# Patient Record
Sex: FEMALE | Race: WHITE | Employment: PART TIME | URBAN - METROPOLITAN AREA
[De-identification: names, ages, dates, MRNs, and addresses within clinical notes are randomized per-mention and may not be internally consistent; named-entity substitution may affect disease eponyms.]

---

## 2017-02-24 ENCOUNTER — OFFICE VISIT (OUTPATIENT)
Dept: OTOLARYNGOLOGY | Facility: CLINIC | Age: 37
End: 2017-02-24

## 2017-02-24 VITALS — TEMPERATURE: 99 F | HEART RATE: 110 BPM | SYSTOLIC BLOOD PRESSURE: 124 MMHG | DIASTOLIC BLOOD PRESSURE: 76 MMHG

## 2017-02-24 DIAGNOSIS — J03.90 TONSILLITIS: Primary | ICD-10-CM

## 2017-02-24 PROCEDURE — 99213 OFFICE O/P EST LOW 20 MIN: CPT | Performed by: OTOLARYNGOLOGY

## 2017-02-24 PROCEDURE — 99212 OFFICE O/P EST SF 10 MIN: CPT | Performed by: OTOLARYNGOLOGY

## 2017-02-24 RX ORDER — CLINDAMYCIN HYDROCHLORIDE 150 MG/1
150 CAPSULE ORAL EVERY 8 HOURS
Qty: 21 CAPSULE | Refills: 0 | Status: SHIPPED | OUTPATIENT
Start: 2017-02-24 | End: 2017-03-03

## 2017-02-24 RX ORDER — PREDNISONE 20 MG/1
TABLET ORAL
Qty: 10 TABLET | Refills: 0 | Status: SHIPPED | OUTPATIENT
Start: 2017-02-24 | End: 2017-10-02 | Stop reason: ALTCHOICE

## 2017-02-24 NOTE — PATIENT INSTRUCTIONS
Pharyngitis (Sore Throat), Report Pending    Pharyngitis (sore throat) is often due to a virus. It can also be caused by the streptococcus, or strep, bacterium, often called strep throat.  Both viral and strep infections can cause throat pain that is wors · For children: Use acetaminophen for fever, fussiness, or discomfort.  In infants older than 10months of age, you may use ibuprofen instead of acetaminophen. Talk with your child's healthcare provider before giving these medicines if your child has chronic · Signs of dehydration (very dark urine or no urine, sunken eyes, dizziness)  · Trouble breathing or noisy breathing  · Muffled voice  · New rash  · Child appears to be getting sicker  Date Last Reviewed: 4/13/2015  © 4727-5554 [employer]. 7

## 2017-02-24 NOTE — PROGRESS NOTES
Zack Ely is a 31year old female.  Patient presents with:  Sore Throat: recurrent strep infection, pt seen in immediate care on 02/22 and pt postive for  2nd strep infection this month, pt was given Penicillin injection and has been augmentin     HPI: Submandibular. Anterior cervical. Posterior cervical. Supraclavicular.    Eyes Normal Conjunctiva - Right: Normal, Left: Normal. Pupil - Right: Normal, Left: Normal.    Ears Normal Inspection - Right: Normal, Left: Normal. Canal - Left: Normal. TM - Right:

## 2017-03-31 PROCEDURE — 87086 URINE CULTURE/COLONY COUNT: CPT | Performed by: INTERNAL MEDICINE

## 2019-01-31 PROCEDURE — 87624 HPV HI-RISK TYP POOLED RSLT: CPT | Performed by: OBSTETRICS & GYNECOLOGY

## 2019-01-31 PROCEDURE — 88175 CYTOPATH C/V AUTO FLUID REDO: CPT | Performed by: OBSTETRICS & GYNECOLOGY

## 2019-06-18 ENCOUNTER — OFFICE VISIT (OUTPATIENT)
Dept: AUDIOLOGY | Facility: CLINIC | Age: 39
End: 2019-06-18
Payer: COMMERCIAL

## 2019-06-18 ENCOUNTER — OFFICE VISIT (OUTPATIENT)
Dept: OTOLARYNGOLOGY | Facility: CLINIC | Age: 39
End: 2019-06-18
Payer: COMMERCIAL

## 2019-06-18 VITALS
TEMPERATURE: 99 F | WEIGHT: 195 LBS | SYSTOLIC BLOOD PRESSURE: 118 MMHG | DIASTOLIC BLOOD PRESSURE: 81 MMHG | BODY MASS INDEX: 32.49 KG/M2 | HEIGHT: 65 IN

## 2019-06-18 DIAGNOSIS — H69.82 DYSFUNCTION OF LEFT EUSTACHIAN TUBE: Primary | ICD-10-CM

## 2019-06-18 DIAGNOSIS — H90.12 CONDUCTIVE HEARING LOSS OF LEFT EAR WITH UNRESTRICTED HEARING OF RIGHT EAR: ICD-10-CM

## 2019-06-18 DIAGNOSIS — H90.12 CONDUCTIVE HEARING LOSS OF LEFT EAR WITH UNRESTRICTED HEARING OF RIGHT EAR: Primary | ICD-10-CM

## 2019-06-18 PROCEDURE — 99212 OFFICE O/P EST SF 10 MIN: CPT | Performed by: OTOLARYNGOLOGY

## 2019-06-18 PROCEDURE — 92550 TYMPANOMETRY & REFLEX THRESH: CPT | Performed by: AUDIOLOGIST

## 2019-06-18 PROCEDURE — 99213 OFFICE O/P EST LOW 20 MIN: CPT | Performed by: OTOLARYNGOLOGY

## 2019-06-18 PROCEDURE — 92557 COMPREHENSIVE HEARING TEST: CPT | Performed by: AUDIOLOGIST

## 2019-06-18 NOTE — PROGRESS NOTES
Caridad Ramos is a 42year old female. Patient presents with:  Ear Pain: left ear for about 1 month     HPI:   He has had problems for some time with pressure and popping and crackling issues with the left ear.   When she travels she always has a great deal Anterior cervical. Posterior cervical. Supraclavicular.    Eyes Normal Conjunctiva - Right: Normal, Left: Normal. Pupil - Right: Normal, Left: Normal.    Ears Normal Inspection - Right: Normal, Left: Normal. Canal - Left: Normal. TM - Right: Normal, Left: N

## 2019-06-20 NOTE — PROGRESS NOTES
AUDIOLOGY REPORT      Massiel Moore is a 42year old female     Referring Provider: Haylie Leiva   YOB: 1980  Medical Record: [id number]      Patient Hearing History:  Patient reported known left hearing worse than right.    Previous [place]

## 2019-08-02 PROCEDURE — 86480 TB TEST CELL IMMUN MEASURE: CPT | Performed by: FAMILY MEDICINE

## 2019-08-02 PROCEDURE — 36415 COLL VENOUS BLD VENIPUNCTURE: CPT | Performed by: FAMILY MEDICINE

## 2021-02-01 DIAGNOSIS — Z23 NEED FOR VACCINATION: ICD-10-CM

## 2021-02-06 ENCOUNTER — IMMUNIZATION (OUTPATIENT)
Dept: LAB | Age: 41
End: 2021-02-06
Attending: HOSPITALIST
Payer: COMMERCIAL

## 2021-02-06 DIAGNOSIS — Z23 NEED FOR VACCINATION: Primary | ICD-10-CM

## 2021-02-06 PROCEDURE — 0001A SARSCOV2 VAC 30MCG/0.3ML IM: CPT

## 2021-02-27 ENCOUNTER — IMMUNIZATION (OUTPATIENT)
Dept: LAB | Age: 41
End: 2021-02-27
Attending: HOSPITALIST
Payer: COMMERCIAL

## 2021-02-27 DIAGNOSIS — Z23 NEED FOR VACCINATION: Primary | ICD-10-CM

## 2021-02-27 PROCEDURE — 0002A SARSCOV2 VAC 30MCG/0.3ML IM: CPT

## 2021-04-10 ENCOUNTER — OFFICE VISIT (OUTPATIENT)
Dept: ENDOCRINOLOGY CLINIC | Facility: CLINIC | Age: 41
End: 2021-04-10
Payer: COMMERCIAL

## 2021-04-10 VITALS
BODY MASS INDEX: 35 KG/M2 | DIASTOLIC BLOOD PRESSURE: 89 MMHG | HEART RATE: 99 BPM | SYSTOLIC BLOOD PRESSURE: 132 MMHG | WEIGHT: 213 LBS

## 2021-04-10 DIAGNOSIS — E03.8 SUBCLINICAL HYPOTHYROIDISM: Primary | ICD-10-CM

## 2021-04-10 PROCEDURE — 3079F DIAST BP 80-89 MM HG: CPT | Performed by: INTERNAL MEDICINE

## 2021-04-10 PROCEDURE — 3075F SYST BP GE 130 - 139MM HG: CPT | Performed by: INTERNAL MEDICINE

## 2021-04-10 PROCEDURE — 99203 OFFICE O/P NEW LOW 30 MIN: CPT | Performed by: INTERNAL MEDICINE

## 2021-04-10 RX ORDER — LEVOTHYROXINE SODIUM 0.05 MG/1
50 TABLET ORAL
Qty: 90 TABLET | Refills: 1 | Status: SHIPPED | OUTPATIENT
Start: 2021-04-10 | End: 2021-09-20

## 2021-04-10 NOTE — PROGRESS NOTES
Name: Gary Blankenship  Date: 4/10/2021    Referring Physician: No ref.  provider found    Patient presents with:  Consult: Patient would like to see Endocrinologist to help manage HashimotosAlanisonleonidas is a 46year old fem moisturizing every other night or 2-3 nights per week, Disp: 60 g, Rfl: 3  •  Nitrofurantoin Monohyd Macro 100 MG Oral Cap, Take 1 capsule (100 mg total) by mouth 2 (two) times daily. , Disp: 10 capsule, Rfl: 0  •  Clindamycin Phos-Benzoyl Perox 1-5 % Exter thyroid gland   Neck: Trachea midline  Neurologic: sensory grossly intact and motor grossly intact  Musculoskeletal:  normal muscle strength and tone  PV: normal pulses of carotids, pedals  Skin:  normal moisture and skin texture  Hair & Nails:  normal sca

## 2021-08-12 ENCOUNTER — LAB ENCOUNTER (OUTPATIENT)
Dept: LAB | Age: 41
End: 2021-08-12
Attending: INTERNAL MEDICINE
Payer: COMMERCIAL

## 2021-08-12 DIAGNOSIS — E03.8 SUBCLINICAL HYPOTHYROIDISM: ICD-10-CM

## 2021-08-12 LAB
CORTIS SERPL-MCNC: 14 UG/DL
T4 FREE SERPL-MCNC: 1.1 NG/DL (ref 0.8–1.7)
TSI SER-ACNC: 0.97 MIU/ML (ref 0.36–3.74)

## 2021-08-12 PROCEDURE — 82533 TOTAL CORTISOL: CPT

## 2021-08-12 PROCEDURE — 84443 ASSAY THYROID STIM HORMONE: CPT

## 2021-08-12 PROCEDURE — 84439 ASSAY OF FREE THYROXINE: CPT

## 2021-08-12 PROCEDURE — 36415 COLL VENOUS BLD VENIPUNCTURE: CPT

## 2021-09-20 RX ORDER — LEVOTHYROXINE SODIUM 0.05 MG/1
TABLET ORAL
Qty: 90 TABLET | Refills: 3 | Status: SHIPPED | OUTPATIENT
Start: 2021-09-20

## 2021-11-08 ENCOUNTER — PATIENT MESSAGE (OUTPATIENT)
Dept: ENDOCRINOLOGY CLINIC | Facility: CLINIC | Age: 41
End: 2021-11-08

## 2021-11-08 DIAGNOSIS — E03.8 SUBCLINICAL HYPOTHYROIDISM: Primary | ICD-10-CM

## 2021-11-08 NOTE — TELEPHONE ENCOUNTER
From: Gary Blankenship  To: Diana Montero MD  Sent: 11/8/2021 6:39 AM CST  Subject: Follow up ultrasound    When i was there in April I think you said I should have a follow up ultrasound on my thyroid after a year (which was in October.)     Should I get that

## 2021-11-20 ENCOUNTER — NURSE ONLY (OUTPATIENT)
Dept: ALLERGY | Facility: CLINIC | Age: 41
End: 2021-11-20
Payer: COMMERCIAL

## 2021-11-20 ENCOUNTER — OFFICE VISIT (OUTPATIENT)
Dept: ALLERGY | Facility: CLINIC | Age: 41
End: 2021-11-20
Payer: COMMERCIAL

## 2021-11-20 VITALS
OXYGEN SATURATION: 97 % | DIASTOLIC BLOOD PRESSURE: 84 MMHG | SYSTOLIC BLOOD PRESSURE: 122 MMHG | HEART RATE: 71 BPM | RESPIRATION RATE: 18 BRPM

## 2021-11-20 DIAGNOSIS — J30.89 PERENNIAL ALLERGIC RHINITIS WITH SEASONAL VARIATION: ICD-10-CM

## 2021-11-20 DIAGNOSIS — J30.89 PERENNIAL ALLERGIC RHINITIS WITH SEASONAL VARIATION: Primary | ICD-10-CM

## 2021-11-20 DIAGNOSIS — Z23 FLU VACCINE NEED: ICD-10-CM

## 2021-11-20 DIAGNOSIS — J30.2 PERENNIAL ALLERGIC RHINITIS WITH SEASONAL VARIATION: ICD-10-CM

## 2021-11-20 DIAGNOSIS — J30.2 PERENNIAL ALLERGIC RHINITIS WITH SEASONAL VARIATION: Primary | ICD-10-CM

## 2021-11-20 DIAGNOSIS — Z92.29 COVID-19 VACCINE SERIES COMPLETED: ICD-10-CM

## 2021-11-20 DIAGNOSIS — J45.909 EXTRINSIC ASTHMA WITHOUT COMPLICATION, UNSPECIFIED ASTHMA SEVERITY, UNSPECIFIED WHETHER PERSISTENT: ICD-10-CM

## 2021-11-20 PROCEDURE — 95004 PERQ TESTS W/ALRGNC XTRCS: CPT | Performed by: ALLERGY & IMMUNOLOGY

## 2021-11-20 PROCEDURE — 95024 IQ TESTS W/ALLERGENIC XTRCS: CPT | Performed by: ALLERGY & IMMUNOLOGY

## 2021-11-20 PROCEDURE — 3074F SYST BP LT 130 MM HG: CPT | Performed by: ALLERGY & IMMUNOLOGY

## 2021-11-20 PROCEDURE — 3079F DIAST BP 80-89 MM HG: CPT | Performed by: ALLERGY & IMMUNOLOGY

## 2021-11-20 PROCEDURE — 99204 OFFICE O/P NEW MOD 45 MIN: CPT | Performed by: ALLERGY & IMMUNOLOGY

## 2021-11-20 RX ORDER — ALBUTEROL SULFATE 90 UG/1
2 AEROSOL, METERED RESPIRATORY (INHALATION) EVERY 6 HOURS PRN
Qty: 1 EACH | Refills: 0 | Status: SHIPPED | OUTPATIENT
Start: 2021-11-20

## 2021-11-20 RX ORDER — LEVOCETIRIZINE DIHYDROCHLORIDE 5 MG/1
5 TABLET, FILM COATED ORAL NIGHTLY
Qty: 90 TABLET | Refills: 0 | Status: SHIPPED | OUTPATIENT
Start: 2021-11-20

## 2021-11-20 RX ORDER — ALBUTEROL SULFATE 90 UG/1
2 AEROSOL, METERED RESPIRATORY (INHALATION) EVERY 6 HOURS PRN
Qty: 3 EACH | Refills: 0 | Status: SHIPPED | OUTPATIENT
Start: 2021-11-20

## 2021-11-20 NOTE — PROGRESS NOTES
Jaiden Rincon is a 46year old female. HPI:   Patient presents with: Allergies: Worsening seasonal and evironmental allergies. Reports that she has to take Zyretc but could be better.  Reports she is having itchy ears, itchy nose, itchy eyes and sinus pr Thyroid disease Brother         hyperthyroid   • Thyroid disease Sister         partial thyroidectomy for nodule      Social History: Social History    Tobacco Use      Smoking status: Never Smoker      Smokeless tobacco: Never Used    Vaping Use      Vapi polyphagia  ENMT:  Negative for ear drainage, hearing loss.  See hpi   Eyes:  Negative for eye discharge and vision loss  Gastrointestinal:  Negative for abdominal pain, diarrhea and vomiting  Genitourinary:  Negative for dysuria and hematuria  Hema/Lymph: intubations. No current albuterol inhaler. Can worsen with exercise including shortness of breath. Handouts on asthma triggers and management provided and reviewed.   Current symptom frequency is 2 days/week or less on average once per week per patient

## 2021-11-20 NOTE — PATIENT INSTRUCTIONS
1.  Asthma  Denies symptoms more than 2 days/week. ED visits or prednisone over the past year. No prior history of intubations. No current albuterol inhaler. Can worsen with exercise including shortness of breath.     Handouts on asthma triggers and man

## 2021-12-06 ENCOUNTER — HOSPITAL ENCOUNTER (OUTPATIENT)
Dept: ULTRASOUND IMAGING | Age: 41
Discharge: HOME | End: 2021-12-06
Attending: INTERNAL MEDICINE
Payer: COMMERCIAL

## 2021-12-06 DIAGNOSIS — E03.8 SUBCLINICAL HYPOTHYROIDISM: ICD-10-CM

## 2021-12-06 PROCEDURE — 76536 US EXAM OF HEAD AND NECK: CPT | Performed by: INTERNAL MEDICINE

## 2021-12-09 ENCOUNTER — LAB ENCOUNTER (OUTPATIENT)
Dept: LAB | Facility: HOSPITAL | Age: 41
End: 2021-12-09
Attending: INTERNAL MEDICINE
Payer: COMMERCIAL

## 2021-12-09 ENCOUNTER — OFFICE VISIT (OUTPATIENT)
Dept: ENDOCRINOLOGY CLINIC | Facility: CLINIC | Age: 41
End: 2021-12-09
Payer: COMMERCIAL

## 2021-12-09 VITALS — DIASTOLIC BLOOD PRESSURE: 82 MMHG | SYSTOLIC BLOOD PRESSURE: 127 MMHG

## 2021-12-09 DIAGNOSIS — E03.9 HYPOTHYROIDISM (ACQUIRED): ICD-10-CM

## 2021-12-09 DIAGNOSIS — E03.9 HYPOTHYROIDISM (ACQUIRED): Primary | ICD-10-CM

## 2021-12-09 PROCEDURE — 84439 ASSAY OF FREE THYROXINE: CPT

## 2021-12-09 PROCEDURE — 36416 COLLJ CAPILLARY BLOOD SPEC: CPT | Performed by: INTERNAL MEDICINE

## 2021-12-09 PROCEDURE — 3079F DIAST BP 80-89 MM HG: CPT | Performed by: INTERNAL MEDICINE

## 2021-12-09 PROCEDURE — 82306 VITAMIN D 25 HYDROXY: CPT

## 2021-12-09 PROCEDURE — 3074F SYST BP LT 130 MM HG: CPT | Performed by: INTERNAL MEDICINE

## 2021-12-09 PROCEDURE — 83036 HEMOGLOBIN GLYCOSYLATED A1C: CPT | Performed by: INTERNAL MEDICINE

## 2021-12-09 PROCEDURE — 84443 ASSAY THYROID STIM HORMONE: CPT

## 2021-12-09 PROCEDURE — 36415 COLL VENOUS BLD VENIPUNCTURE: CPT

## 2021-12-09 PROCEDURE — 82947 ASSAY GLUCOSE BLOOD QUANT: CPT | Performed by: INTERNAL MEDICINE

## 2021-12-09 PROCEDURE — 99213 OFFICE O/P EST LOW 20 MIN: CPT | Performed by: INTERNAL MEDICINE

## 2021-12-09 NOTE — PROGRESS NOTES
Name: Christine Masterson  Date: 12/9/2021    Referring Physician: No ref. provider found    No chief complaint on file. HISTORY OF PRESENT ILLNESS   Christine Masterson is a 46year old female who presents for No chief complaint on file.     45 y/o F presents for f 2-3 nights per week, Disp: 60 g, Rfl: 3  •  LEVOTHYROXINE 50 MCG Oral Tab, TAKE 1 TABLET BEFORE BREAKFAST, Disp: 90 tablet, Rfl: 3  •  ONEXTON 1.2-3.75 % External Gel, Apply to aa every day., Disp: 50 g, Rfl: 2  •  Clobetasol Propionate 0.05 % External Marya [place] Concerns:        Caffeine Concern: Yes          coffee        Exercise: No      Medical History:   Past Medical History:   Diagnosis Date   • Asthma    • Gestational diabetes    • Hemorrhoids        Surgical history:   Past Surgical History:   Procedure La

## 2021-12-10 ENCOUNTER — PATIENT MESSAGE (OUTPATIENT)
Dept: ENDOCRINOLOGY CLINIC | Facility: CLINIC | Age: 41
End: 2021-12-10

## 2021-12-10 NOTE — TELEPHONE ENCOUNTER
Dr. Jamel Zamudio, see patient response to result note. Reports taking 2000 units vitamin D daily already.

## 2021-12-10 NOTE — TELEPHONE ENCOUNTER
From: Tone oJn  To: Sarath Koch MD  Sent: 12/10/2021 12:07 PM CST  Subject: Vitamin d    I saw your message regarding my vitamin d and the amount to take every day. I already take that much vitamin d per day, and have for a little over a year.  Should

## 2022-05-11 ENCOUNTER — PATIENT MESSAGE (OUTPATIENT)
Dept: ENDOCRINOLOGY CLINIC | Facility: CLINIC | Age: 42
End: 2022-05-11

## 2022-05-11 NOTE — TELEPHONE ENCOUNTER
From: Shivam Kraft  To: Inessa Niteo MD  Sent: 5/11/2022 7:58 AM CDT  Subject: Upcoming appt     Do I need any blood work/tests done before my next appt June 2nd?      Thank you!!    Danika Calhoun

## 2022-05-11 NOTE — TELEPHONE ENCOUNTER
Dr. Cadet Dock    Vitamin D and thyroid labs pended.  Please advise on any additional lab work needed

## 2022-05-29 ENCOUNTER — LAB ENCOUNTER (OUTPATIENT)
Dept: LAB | Facility: HOSPITAL | Age: 42
End: 2022-05-29
Attending: INTERNAL MEDICINE
Payer: COMMERCIAL

## 2022-05-29 DIAGNOSIS — E06.3 HYPOTHYROIDISM DUE TO HASHIMOTO'S THYROIDITIS: ICD-10-CM

## 2022-05-29 DIAGNOSIS — E55.9 VITAMIN D DEFICIENCY: ICD-10-CM

## 2022-05-29 DIAGNOSIS — E03.8 HYPOTHYROIDISM DUE TO HASHIMOTO'S THYROIDITIS: ICD-10-CM

## 2022-05-29 LAB
T4 FREE SERPL-MCNC: 1.2 NG/DL (ref 0.8–1.7)
TSI SER-ACNC: 1.46 MIU/ML (ref 0.36–3.74)
VIT D+METAB SERPL-MCNC: 43.6 NG/ML (ref 30–100)

## 2022-05-29 PROCEDURE — 36415 COLL VENOUS BLD VENIPUNCTURE: CPT

## 2022-05-29 PROCEDURE — 84443 ASSAY THYROID STIM HORMONE: CPT

## 2022-05-29 PROCEDURE — 84439 ASSAY OF FREE THYROXINE: CPT

## 2022-05-29 PROCEDURE — 82306 VITAMIN D 25 HYDROXY: CPT

## 2022-06-02 ENCOUNTER — OFFICE VISIT (OUTPATIENT)
Dept: ENDOCRINOLOGY CLINIC | Facility: CLINIC | Age: 42
End: 2022-06-02
Payer: COMMERCIAL

## 2022-06-02 VITALS
WEIGHT: 216 LBS | BODY MASS INDEX: 36 KG/M2 | HEART RATE: 94 BPM | DIASTOLIC BLOOD PRESSURE: 82 MMHG | SYSTOLIC BLOOD PRESSURE: 120 MMHG

## 2022-06-02 DIAGNOSIS — E06.3 HYPOTHYROIDISM DUE TO HASHIMOTO'S THYROIDITIS: Primary | ICD-10-CM

## 2022-06-02 DIAGNOSIS — E03.8 HYPOTHYROIDISM DUE TO HASHIMOTO'S THYROIDITIS: Primary | ICD-10-CM

## 2022-06-02 PROCEDURE — 3079F DIAST BP 80-89 MM HG: CPT | Performed by: INTERNAL MEDICINE

## 2022-06-02 PROCEDURE — 3074F SYST BP LT 130 MM HG: CPT | Performed by: INTERNAL MEDICINE

## 2022-06-02 PROCEDURE — 99213 OFFICE O/P EST LOW 20 MIN: CPT | Performed by: INTERNAL MEDICINE

## 2022-06-02 RX ORDER — LIOTHYRONINE SODIUM 5 UG/1
5 TABLET ORAL DAILY
Qty: 90 TABLET | Refills: 1 | Status: SHIPPED | OUTPATIENT
Start: 2022-06-02

## 2022-09-12 ENCOUNTER — OFFICE VISIT (OUTPATIENT)
Dept: ENDOCRINOLOGY CLINIC | Facility: CLINIC | Age: 42
End: 2022-09-12
Payer: COMMERCIAL

## 2022-09-12 ENCOUNTER — LAB ENCOUNTER (OUTPATIENT)
Dept: LAB | Facility: HOSPITAL | Age: 42
End: 2022-09-12
Attending: INTERNAL MEDICINE
Payer: COMMERCIAL

## 2022-09-12 VITALS
DIASTOLIC BLOOD PRESSURE: 67 MMHG | BODY MASS INDEX: 36 KG/M2 | SYSTOLIC BLOOD PRESSURE: 102 MMHG | HEART RATE: 84 BPM | WEIGHT: 219 LBS

## 2022-09-12 DIAGNOSIS — E06.3 HYPOTHYROIDISM DUE TO HASHIMOTO'S THYROIDITIS: ICD-10-CM

## 2022-09-12 DIAGNOSIS — E66.9 OBESITY (BMI 30.0-34.9): ICD-10-CM

## 2022-09-12 DIAGNOSIS — E03.8 HYPOTHYROIDISM DUE TO HASHIMOTO'S THYROIDITIS: ICD-10-CM

## 2022-09-12 DIAGNOSIS — E03.8 SUBCLINICAL HYPOTHYROIDISM: Primary | ICD-10-CM

## 2022-09-12 LAB
T4 FREE SERPL-MCNC: 1 NG/DL (ref 0.8–1.7)
TSI SER-ACNC: 2.75 MIU/ML (ref 0.36–3.74)

## 2022-09-12 PROCEDURE — 99214 OFFICE O/P EST MOD 30 MIN: CPT | Performed by: INTERNAL MEDICINE

## 2022-09-12 PROCEDURE — 3074F SYST BP LT 130 MM HG: CPT | Performed by: INTERNAL MEDICINE

## 2022-09-12 PROCEDURE — 84443 ASSAY THYROID STIM HORMONE: CPT

## 2022-09-12 PROCEDURE — 36415 COLL VENOUS BLD VENIPUNCTURE: CPT

## 2022-09-12 PROCEDURE — 3078F DIAST BP <80 MM HG: CPT | Performed by: INTERNAL MEDICINE

## 2022-09-12 PROCEDURE — 84439 ASSAY OF FREE THYROXINE: CPT

## 2022-09-12 RX ORDER — SEMAGLUTIDE 1.34 MG/ML
0.5 INJECTION, SOLUTION SUBCUTANEOUS WEEKLY
Qty: 1.5 ML | Refills: 3 | Status: SHIPPED | OUTPATIENT
Start: 2022-09-12

## 2022-09-12 RX ORDER — SEMAGLUTIDE 1.34 MG/ML
0.5 INJECTION, SOLUTION SUBCUTANEOUS WEEKLY
Qty: 1.5 ML | Refills: 0 | COMMUNITY
Start: 2022-09-12

## 2022-09-12 RX ORDER — LEVOTHYROXINE SODIUM 0.05 MG/1
50 TABLET ORAL
Qty: 90 TABLET | Refills: 3 | Status: SHIPPED | OUTPATIENT
Start: 2022-09-12

## 2022-09-12 RX ORDER — LIOTHYRONINE SODIUM 5 UG/1
5 TABLET ORAL DAILY
Qty: 90 TABLET | Refills: 1 | Status: SHIPPED | OUTPATIENT
Start: 2022-09-12

## 2022-09-12 NOTE — PROGRESS NOTES
Sample of Ozempic 0.25 mg provided to patient in clinic today. Reviewed administration, storage, potential medication interactions, allergies, and side effects of the medication with patient. Reviewed dosage and subcutaneous injection technique. Written instructions of the above including medication dose, route, frequency, storage, administration, and potential side effects provided to patient.

## 2022-09-12 NOTE — PATIENT INSTRUCTIONS
START Ozempic 0.25mg subcutaneous weekly for one month then increase to 0.5mg subcutaneous weekly 86.68 70.83

## 2022-09-15 ENCOUNTER — TELEPHONE (OUTPATIENT)
Dept: ENDOCRINOLOGY CLINIC | Facility: CLINIC | Age: 42
End: 2022-09-15

## 2022-09-19 RX ORDER — LEVOTHYROXINE SODIUM 0.05 MG/1
TABLET ORAL
Qty: 90 TABLET | Refills: 3 | Status: SHIPPED | OUTPATIENT
Start: 2022-09-19

## 2022-09-19 NOTE — TELEPHONE ENCOUNTER
LOV: 9/12/22    RTC: 1 Year    FU: 3/16/23    Last Refill: 9/12/22 with a 1 Year Supply    1 Year Supply Pending Below  Please refuse if appropriate

## 2022-09-26 NOTE — TELEPHONE ENCOUNTER
Received fax from [address] regarding the request for Ozempic pens. \"Coverage is provided for the diagnosis of diabetes mellitus type 2. Coverage cannot be authorized at this time. \"    Case #: [id number]    Patient does not have DM2. Per message 09/17/22, patient interested in starting sample ozempic. Sent [employer] asking if this has been started or not.

## 2022-11-10 ENCOUNTER — OFFICE VISIT (OUTPATIENT)
Dept: FAMILY MEDICINE CLINIC | Facility: CLINIC | Age: 42
End: 2022-11-10
Payer: COMMERCIAL

## 2022-11-10 ENCOUNTER — LAB ENCOUNTER (OUTPATIENT)
Dept: LAB | Age: 42
End: 2022-11-10
Attending: STUDENT IN AN ORGANIZED HEALTH CARE EDUCATION/TRAINING PROGRAM
Payer: COMMERCIAL

## 2022-11-10 VITALS
BODY MASS INDEX: 35.82 KG/M2 | HEIGHT: 65 IN | SYSTOLIC BLOOD PRESSURE: 116 MMHG | WEIGHT: 215 LBS | DIASTOLIC BLOOD PRESSURE: 82 MMHG | HEART RATE: 91 BPM

## 2022-11-10 DIAGNOSIS — N92.6 IRREGULAR MENSES: ICD-10-CM

## 2022-11-10 DIAGNOSIS — N92.6 IRREGULAR MENSES: Primary | ICD-10-CM

## 2022-11-10 PROCEDURE — 83520 IMMUNOASSAY QUANT NOS NONAB: CPT

## 2022-11-10 PROCEDURE — 3008F BODY MASS INDEX DOCD: CPT | Performed by: STUDENT IN AN ORGANIZED HEALTH CARE EDUCATION/TRAINING PROGRAM

## 2022-11-10 PROCEDURE — 99203 OFFICE O/P NEW LOW 30 MIN: CPT | Performed by: STUDENT IN AN ORGANIZED HEALTH CARE EDUCATION/TRAINING PROGRAM

## 2022-11-10 PROCEDURE — 3074F SYST BP LT 130 MM HG: CPT | Performed by: STUDENT IN AN ORGANIZED HEALTH CARE EDUCATION/TRAINING PROGRAM

## 2022-11-10 PROCEDURE — 36415 COLL VENOUS BLD VENIPUNCTURE: CPT

## 2022-11-10 PROCEDURE — 3079F DIAST BP 80-89 MM HG: CPT | Performed by: STUDENT IN AN ORGANIZED HEALTH CARE EDUCATION/TRAINING PROGRAM

## 2022-11-10 RX ORDER — UBIDECARENONE 75 MG
250 CAPSULE ORAL DAILY
COMMUNITY

## 2022-11-13 LAB — ANTI-MULLERIAN HORMONE: 1.6 NG/ML

## 2023-02-22 ENCOUNTER — PATIENT MESSAGE (OUTPATIENT)
Dept: ENDOCRINOLOGY CLINIC | Facility: CLINIC | Age: 43
End: 2023-02-22

## 2023-02-22 DIAGNOSIS — E04.1 THYROID NODULE: Primary | ICD-10-CM

## 2023-02-22 NOTE — TELEPHONE ENCOUNTER
Messaged pt that her message has been routed to Dr Jb Escalona and stated provider is out of the office until next week.

## 2023-03-03 ENCOUNTER — HOSPITAL ENCOUNTER (OUTPATIENT)
Dept: ULTRASOUND IMAGING | Facility: HOSPITAL | Age: 43
Discharge: HOME | End: 2023-03-03
Attending: INTERNAL MEDICINE
Payer: COMMERCIAL

## 2023-03-03 DIAGNOSIS — E04.1 THYROID NODULE: ICD-10-CM

## 2023-03-03 PROCEDURE — 76536 US EXAM OF HEAD AND NECK: CPT | Performed by: INTERNAL MEDICINE

## 2023-03-16 ENCOUNTER — OFFICE VISIT (OUTPATIENT)
Dept: ENDOCRINOLOGY CLINIC | Facility: CLINIC | Age: 43
End: 2023-03-16

## 2023-03-16 VITALS
SYSTOLIC BLOOD PRESSURE: 133 MMHG | BODY MASS INDEX: 36 KG/M2 | WEIGHT: 217 LBS | DIASTOLIC BLOOD PRESSURE: 86 MMHG | HEART RATE: 92 BPM

## 2023-03-16 DIAGNOSIS — E06.3 HYPOTHYROIDISM DUE TO HASHIMOTO'S THYROIDITIS: Primary | ICD-10-CM

## 2023-03-16 DIAGNOSIS — E66.9 OBESITY (BMI 30-39.9): ICD-10-CM

## 2023-03-16 DIAGNOSIS — E03.8 HYPOTHYROIDISM DUE TO HASHIMOTO'S THYROIDITIS: Primary | ICD-10-CM

## 2023-03-16 PROCEDURE — 3079F DIAST BP 80-89 MM HG: CPT | Performed by: INTERNAL MEDICINE

## 2023-03-16 PROCEDURE — 3075F SYST BP GE 130 - 139MM HG: CPT | Performed by: INTERNAL MEDICINE

## 2023-03-16 PROCEDURE — 99213 OFFICE O/P EST LOW 20 MIN: CPT | Performed by: INTERNAL MEDICINE

## 2023-03-16 RX ORDER — SEMAGLUTIDE 0.5 MG/.5ML
0.5 INJECTION, SOLUTION SUBCUTANEOUS WEEKLY
Qty: 4 EACH | Refills: 0 | Status: SHIPPED | OUTPATIENT
Start: 2023-04-16

## 2023-03-16 RX ORDER — SEMAGLUTIDE 0.25 MG/.5ML
0.25 INJECTION, SOLUTION SUBCUTANEOUS WEEKLY
Qty: 4 EACH | Refills: 0 | Status: SHIPPED | OUTPATIENT
Start: 2023-03-16

## 2023-03-16 RX ORDER — SEMAGLUTIDE 1 MG/.5ML
1 INJECTION, SOLUTION SUBCUTANEOUS WEEKLY
Qty: 4 EACH | Refills: 0 | Status: SHIPPED | OUTPATIENT
Start: 2023-05-16

## 2023-03-27 ENCOUNTER — TELEPHONE (OUTPATIENT)
Dept: ENDOCRINOLOGY CLINIC | Facility: CLINIC | Age: 43
End: 2023-03-27

## 2023-03-31 ENCOUNTER — PATIENT MESSAGE (OUTPATIENT)
Dept: ENDOCRINOLOGY CLINIC | Facility: CLINIC | Age: 43
End: 2023-03-31

## 2023-03-31 NOTE — TELEPHONE ENCOUNTER
Medication  CGM  pump supply Requested: Wegovy 1mg/0.5ml pen injector    Sig: Inject 0.5 mL (0.25 mg total) into the skin once a week.     DX Code: E66.9                                       Case Number/Pending Ref#:  [id number]

## 2023-04-03 NOTE — TELEPHONE ENCOUNTER
Refer to TE 3/27 where PA is required and MA routed message to [address] [place] [address] department 3/31

## 2023-04-17 NOTE — TELEPHONE ENCOUNTER
Called [employer] [phone], per automated system , case #[id number], approved for wegovy,  Coverage effective 3/18/2023-11/13/2023. [employer], [phone], spoke with Patti Kay. She states went through, 4 pens for 0.25 mg dose. She states need to send new pa next month for 0.5mg dose.     My chart message sent to patient of approval.

## 2023-04-21 NOTE — TELEPHONE ENCOUNTER
Received PA approval from [employer] for Wegovy 0.25 mg from 3/18/2023-11/13/2023. See further details below, pt has been notified by PA team. Sent to scanning. Signing encounter, no further action is required at this time.

## 2023-05-10 RX ORDER — LIOTHYRONINE SODIUM 5 UG/1
TABLET ORAL
Qty: 90 TABLET | Refills: 3 | Status: SHIPPED | OUTPATIENT
Start: 2023-05-10

## 2023-06-16 ENCOUNTER — PATIENT MESSAGE (OUTPATIENT)
Dept: ENDOCRINOLOGY CLINIC | Facility: CLINIC | Age: 43
End: 2023-06-16

## 2023-06-19 RX ORDER — SEMAGLUTIDE 1 MG/.5ML
1 INJECTION, SOLUTION SUBCUTANEOUS WEEKLY
Qty: 4 EACH | Refills: 0 | Status: SHIPPED | OUTPATIENT
Start: 2023-06-19

## 2023-08-18 RX ORDER — SEMAGLUTIDE 1.7 MG/.75ML
1.7 INJECTION, SOLUTION SUBCUTANEOUS WEEKLY
Qty: 3 ML | Refills: 3 | Status: SHIPPED | OUTPATIENT
Start: 2023-08-18

## 2023-09-03 ENCOUNTER — PATIENT MESSAGE (OUTPATIENT)
Dept: ENDOCRINOLOGY CLINIC | Facility: CLINIC | Age: 43
End: 2023-09-03

## 2023-09-03 DIAGNOSIS — E06.3 HYPOTHYROIDISM DUE TO HASHIMOTO'S THYROIDITIS: Primary | ICD-10-CM

## 2023-09-03 DIAGNOSIS — E03.8 HYPOTHYROIDISM DUE TO HASHIMOTO'S THYROIDITIS: Primary | ICD-10-CM

## 2023-09-05 NOTE — TELEPHONE ENCOUNTER
From: Shanta Sol  To: Radha Thomas MD  Sent: 9/3/2023 11:24 AM CDT  Subject: Upcoming appointment    Can you put the orders in for any blood work I will need done prior to my appointment 9/21? Thank you!

## 2023-09-12 ENCOUNTER — LAB ENCOUNTER (OUTPATIENT)
Dept: LAB | Facility: HOSPITAL | Age: 43
End: 2023-09-12
Attending: INTERNAL MEDICINE
Payer: COMMERCIAL

## 2023-09-12 DIAGNOSIS — E06.3 HYPOTHYROIDISM DUE TO HASHIMOTO'S THYROIDITIS: ICD-10-CM

## 2023-09-12 DIAGNOSIS — E03.8 HYPOTHYROIDISM DUE TO HASHIMOTO'S THYROIDITIS: ICD-10-CM

## 2023-09-12 LAB
ALBUMIN SERPL-MCNC: 3.6 G/DL (ref 3.4–5)
ALBUMIN/GLOB SERPL: 1.2 {RATIO} (ref 1–2)
ALP LIVER SERPL-CCNC: 54 U/L
ALT SERPL-CCNC: 22 U/L
ANION GAP SERPL CALC-SCNC: 9 MMOL/L (ref 0–18)
AST SERPL-CCNC: 16 U/L (ref 15–37)
BILIRUB SERPL-MCNC: 0.5 MG/DL (ref 0.1–2)
BUN BLD-MCNC: 8 MG/DL (ref 7–18)
BUN/CREAT SERPL: 9.8 (ref 10–20)
CALCIUM BLD-MCNC: 9 MG/DL (ref 8.5–10.1)
CHLORIDE SERPL-SCNC: 110 MMOL/L (ref 98–112)
CHOLEST SERPL-MCNC: 116 MG/DL (ref ?–200)
CO2 SERPL-SCNC: 23 MMOL/L (ref 21–32)
CREAT BLD-MCNC: 0.82 MG/DL
EGFRCR SERPLBLD CKD-EPI 2021: 92 ML/MIN/1.73M2 (ref 60–?)
FASTING PATIENT LIPID ANSWER: YES
FASTING STATUS PATIENT QL REPORTED: YES
GLOBULIN PLAS-MCNC: 3 G/DL (ref 2.8–4.4)
GLUCOSE BLD-MCNC: 84 MG/DL (ref 70–99)
HDLC SERPL-MCNC: 39 MG/DL (ref 40–59)
LDLC SERPL CALC-MCNC: 63 MG/DL (ref ?–100)
NONHDLC SERPL-MCNC: 77 MG/DL (ref ?–130)
OSMOLALITY SERPL CALC.SUM OF ELEC: 292 MOSM/KG (ref 275–295)
POTASSIUM SERPL-SCNC: 4.1 MMOL/L (ref 3.5–5.1)
PROT SERPL-MCNC: 6.6 G/DL (ref 6.4–8.2)
SODIUM SERPL-SCNC: 142 MMOL/L (ref 136–145)
T4 FREE SERPL-MCNC: 1.1 NG/DL (ref 0.8–1.7)
TRIGL SERPL-MCNC: 69 MG/DL (ref 30–149)
TSI SER-ACNC: 0.93 MIU/ML (ref 0.36–3.74)
VLDLC SERPL CALC-MCNC: 10 MG/DL (ref 0–30)

## 2023-09-12 PROCEDURE — 80053 COMPREHEN METABOLIC PANEL: CPT

## 2023-09-12 PROCEDURE — 84439 ASSAY OF FREE THYROXINE: CPT

## 2023-09-12 PROCEDURE — 84443 ASSAY THYROID STIM HORMONE: CPT

## 2023-09-12 PROCEDURE — 80061 LIPID PANEL: CPT

## 2023-09-12 PROCEDURE — 36415 COLL VENOUS BLD VENIPUNCTURE: CPT

## 2023-09-21 ENCOUNTER — OFFICE VISIT (OUTPATIENT)
Dept: ENDOCRINOLOGY CLINIC | Facility: CLINIC | Age: 43
End: 2023-09-21

## 2023-09-21 VITALS
DIASTOLIC BLOOD PRESSURE: 74 MMHG | BODY MASS INDEX: 31.65 KG/M2 | HEART RATE: 86 BPM | WEIGHT: 190 LBS | SYSTOLIC BLOOD PRESSURE: 122 MMHG | HEIGHT: 65 IN

## 2023-09-21 DIAGNOSIS — E66.9 OBESITY (BMI 30.0-34.9): ICD-10-CM

## 2023-09-21 DIAGNOSIS — E03.8 SUBCLINICAL HYPOTHYROIDISM: Primary | ICD-10-CM

## 2023-09-21 PROCEDURE — 3074F SYST BP LT 130 MM HG: CPT | Performed by: INTERNAL MEDICINE

## 2023-09-21 PROCEDURE — 3008F BODY MASS INDEX DOCD: CPT | Performed by: INTERNAL MEDICINE

## 2023-09-21 PROCEDURE — 3078F DIAST BP <80 MM HG: CPT | Performed by: INTERNAL MEDICINE

## 2023-09-21 PROCEDURE — 99213 OFFICE O/P EST LOW 20 MIN: CPT | Performed by: INTERNAL MEDICINE

## 2023-09-21 RX ORDER — SEMAGLUTIDE 1.7 MG/.75ML
1.7 INJECTION, SOLUTION SUBCUTANEOUS WEEKLY
Qty: 12 EACH | Refills: 1 | Status: SHIPPED | OUTPATIENT
Start: 2023-10-02

## 2023-09-21 RX ORDER — SEMAGLUTIDE 1.7 MG/.75ML
1.7 INJECTION, SOLUTION SUBCUTANEOUS WEEKLY
Qty: 4 EACH | Refills: 0 | Status: SHIPPED | OUTPATIENT
Start: 2023-09-21

## 2023-11-09 ENCOUNTER — PATIENT MESSAGE (OUTPATIENT)
Dept: ENDOCRINOLOGY CLINIC | Facility: CLINIC | Age: 43
End: 2023-11-09

## 2023-11-13 NOTE — TELEPHONE ENCOUNTER
Spoke to [employer] regarding patient's message. Per rep Mlaik Meredith 1.7 mg approved dates: 10/14/23-11/12/24 case # [id number]. Patient has been notified via mychart.

## 2023-11-15 ENCOUNTER — OFFICE VISIT (OUTPATIENT)
Dept: DERMATOLOGY CLINIC | Facility: CLINIC | Age: 43
End: 2023-11-15
Payer: COMMERCIAL

## 2023-11-15 DIAGNOSIS — L70.0 ACNE VULGARIS: ICD-10-CM

## 2023-11-15 DIAGNOSIS — L82.0 SEBORRHEIC KERATOSIS, INFLAMED: Primary | ICD-10-CM

## 2023-11-15 PROCEDURE — 99204 OFFICE O/P NEW MOD 45 MIN: CPT | Performed by: STUDENT IN AN ORGANIZED HEALTH CARE EDUCATION/TRAINING PROGRAM

## 2023-11-15 RX ORDER — TRETINOIN 0.5 MG/G
1 CREAM TOPICAL NIGHTLY
Qty: 45 G | Refills: 0 | Status: SHIPPED | OUTPATIENT
Start: 2023-11-15 | End: 2024-02-13

## 2023-11-15 RX ORDER — CLINDAMYCIN PHOSPHATE AND BENZOYL PEROXIDE 10; 37.5 MG/G; MG/G
GEL TOPICAL
Qty: 50 G | Refills: 2 | Status: SHIPPED | OUTPATIENT
Start: 2023-11-15

## 2023-11-15 NOTE — PROGRESS NOTES
November 15, 2023    New patient     CHIEF COMPLAINT: Acne    HISTORY OF PRESENT ILLNESS: .    1. Acne  Location: facial   Duration: ongoing throughout life  Signs and symptoms: pimples  Current treatment: ONEXTON 1.2-3.75 % External Gel   Past treatments: ONEXTON 1.2-3.75 % External Gel     2. Lesion  Location: right neck   Duration: over 1 year  Signs and symptoms: itchy  Current treatment: none  Past treatments: none        DERM HISTORY:  History of skin cancer: No  History of chronic skin disease/condition: No    FAMILY HISTORY:  History of melanoma: No  History of chronic skin disease/condition: Yes  Maternal grandmother BCC  History/Other:    REVIEW OF SYSTEMS:  Constitutional: Denies fever, chills, unintentional weight loss. Skin as per HPI    PAST MEDICAL HISTORY:  Past Medical History:   Diagnosis Date    Asthma     Gestational diabetes     Hemorrhoids     Hypothyroidism        Medications  Current Outpatient Medications   Medication Sig Dispense Refill    semaglutide-weight management (WEGOVY) 1.7 MG/0.75ML Subcutaneous Solution Auto-injector Inject 0.75 mL (1.7 mg total) into the skin once a week. 12 each 1    ondansetron 4 MG Oral Tablet Dispersible Take 1 tablet (4 mg total) by mouth every 8 (eight) hours as needed for Nausea. 30 tablet 0    LIOTHYRONINE 5 MCG Oral Tab TAKE 1 TABLET DAILY 90 tablet 3    cyanocobalamin 100 MCG Oral Tab Take 2.5 tablets (250 mcg total) by mouth daily. LEVOTHYROXINE 50 MCG Oral Tab TAKE 1 TABLET BEFORE BREAKFAST 90 tablet 3    ONEXTON 1.2-3.75 % External Gel APPLY TO AFFECTED AREA EVERY DAY. 50 g 2    albuterol (PROAIR HFA) 108 (90 Base) MCG/ACT Inhalation Aero Soln Inhale 2 puffs into the lungs every 6 (six) hours as needed for Wheezing. 1 each 0    levocetirizine (XYZAL) 5 MG Oral Tab Take 1 tablet (5 mg total) by mouth nightly.  90 tablet 0    Cetirizine HCl 10 MG Oral Cap       Multiple Vitamins-Calcium (ONE-A-DAY WOMENS FORMULA OR)       D-MANNOSE OR Fluticasone Propionate 50 MCG/ACT Nasal Suspension 1 spray by Each Nare route daily. 1 Bottle 2    semaglutide-weight management (WEGOVY) 1.7 MG/0.75ML Subcutaneous Solution Auto-injector Inject 0.75 mL (1.7 mg total) into the skin once a week. (Patient not taking: Reported on 11/15/2023) 4 each 0    semaglutide-weight management (WEGOVY) 1.7 MG/0.75ML Subcutaneous Solution Auto-injector Inject 0.75 mL (1.7 mg total) into the skin once a week. (Patient not taking: Reported on 11/15/2023) 3 mL 3    semaglutide-weight management (WEGOVY) 1 MG/0.5ML Subcutaneous Solution Auto-injector Inject 0.5 mL (1 mg total) into the skin once a week. (Patient not taking: Reported on 11/15/2023) 4 each 0    semaglutide-weight management (WEGOVY) 1 MG/0.5ML Subcutaneous Solution Auto-injector Inject 0.5 mL (1 mg total) into the skin once a week. (Patient not taking: Reported on 11/15/2023) 4 each 0    Desonide 0.05 % External Ointment Apply to aa 1-2 x daily sparingly for no more than 2 weeks (Patient not taking: Reported on 11/15/2023) 15 g 1    Pimecrolimus (ELIDEL) 1 % External Cream Apply to AA eyelid BID prn flare (Patient not taking: Reported on 11/15/2023) 30 g 1    Tazarotene (TAZORAC) 0.1 % External Cream Apply to aa after moisturizing every other night or 2-3 nights per week (Patient not taking: Reported on 11/15/2023) 60 g 3    Clobetasol Propionate 0.05 % External Shampoo Apply to  Scalp and rinse out every day to every other day only when flaring (Patient not taking: Reported on 11/15/2023) 118 mL 3    Azelaic Acid (FINACEA) 15 % External Foam Apply 50 g topically 2 (two) times a day. 50 g 4    Clindamycin Phos-Benzoyl Perox 1.2-2.5 % External Gel Apply to aa every day (Patient not taking: Reported on 11/15/2023) 50 g 4       Objective:    PHYSICAL EXAM:  General: awake, alert, no acute distress  Skin: Skin exam was performed today including the following: Face and neck.  Pertinent findings include:   - Right neck with a brown stuck on papule  - Face clear today    ASSESSMENT & PLAN:  Pathophysiology of diagnoses discussed with patient. Therapeutic options reviewed. Risks, benefits, and alternatives discussed with patient. Instructions reviewed at length. #Acne vulgaris  - Continue Onexton once daily to face  - Tretinoin 0.05% pea-sized amount to entire face every other night. Work your way up to using nightly. - Discussed use of vitamin C in the morning. If patient is irritated or has breakout from this would recommend stopping. #Inflamed seborrheic keratosis  - Reassured regarding benign nature of lesion   - Cryotherapy today. N/C    - Cryosurgery of non-malignant lesion(s)  - Risks, benefits, alternatives and personnel required for cryosurgery reviewed with patient. Pt verbalizes understanding and wishes to proceed. - Cryosurgery performed with Liquid Nitrogen via cryostat spray gun to ISK. 1 lesion(s) treated. - Patient tolerated well and wound care discussed. Patient to return if not improved or resolved in 1 month's time at which we time we would perform a biopsy of the area.     Return to clinic: 1 year or sooner if something concerning arises     Kaylynn Olson MD

## 2023-11-16 ENCOUNTER — TELEPHONE (OUTPATIENT)
Dept: DERMATOLOGY CLINIC | Facility: CLINIC | Age: 43
End: 2023-11-16

## 2023-11-16 NOTE — TELEPHONE ENCOUNTER
Call from [address]    Please call [phone] for list of alternatives for Mabeline Girard    Ref [id number]

## 2023-11-24 ENCOUNTER — TELEPHONE (OUTPATIENT)
Dept: DERMATOLOGY CLINIC | Facility: CLINIC | Age: 43
End: 2023-11-24

## 2023-11-24 NOTE — TELEPHONE ENCOUNTER
Prior Authorization     Not listed:   Onexton    Patient was approved for coverage of Need Syn 300    Current Outpatient Medications   Medication Sig Dispense Refill

## 2023-11-27 NOTE — TELEPHONE ENCOUNTER
Please disregard this message. Faxes were confused. Pt is NOT on [employer]. This fax was for [place]. PA needed. The [employer] approval was for a completely different patient. Thank you.

## 2024-01-02 RX ORDER — LEVOTHYROXINE SODIUM 0.05 MG/1
50 TABLET ORAL
Qty: 90 TABLET | Refills: 1 | Status: SHIPPED | OUTPATIENT
Start: 2024-01-02

## 2024-01-08 ENCOUNTER — PATIENT MESSAGE (OUTPATIENT)
Dept: ENDOCRINOLOGY CLINIC | Facility: CLINIC | Age: 44
End: 2024-01-08

## 2024-01-08 DIAGNOSIS — E04.1 THYROID NODULE: Primary | ICD-10-CM

## 2024-01-08 NOTE — TELEPHONE ENCOUNTER
Patient requesting thyroid US order. Last thyroid US 3/3/23. Pended order for estimated date of 3/1/24.

## 2024-01-08 NOTE — TELEPHONE ENCOUNTER
From: Nayely Fisher  To: Bea Castellano  Sent: 1/8/2024 9:28 AM CST  Subject: Ultrasound    Could you put the orders in for my yearly thyroid ultrasound so I can get it done before my appointment in March? No ramirez-thank you!!     Nayely

## 2024-02-15 RX ORDER — SEMAGLUTIDE 1.7 MG/.75ML
1.7 INJECTION, SOLUTION SUBCUTANEOUS WEEKLY
Qty: 9 ML | Refills: 1 | Status: SHIPPED | OUTPATIENT
Start: 2024-02-15

## 2024-02-28 ENCOUNTER — PATIENT MESSAGE (OUTPATIENT)
Dept: ENDOCRINOLOGY CLINIC | Facility: CLINIC | Age: 44
End: 2024-02-28

## 2024-02-28 DIAGNOSIS — E03.8 HYPOTHYROIDISM DUE TO HASHIMOTO'S THYROIDITIS: Primary | ICD-10-CM

## 2024-02-28 DIAGNOSIS — E06.3 HYPOTHYROIDISM DUE TO HASHIMOTO'S THYROIDITIS: Primary | ICD-10-CM

## 2024-02-29 NOTE — TELEPHONE ENCOUNTER
From: Nayely Fisher  To: Bea Castellano  Sent: 2/28/2024 3:16 PM CST  Subject: Blood work    Can you put the orders in for any blood work you want done before my next appt at the end of March? Thank you!

## 2024-03-15 ENCOUNTER — HOSPITAL ENCOUNTER (OUTPATIENT)
Dept: ULTRASOUND IMAGING | Facility: HOSPITAL | Age: 44
Discharge: HOME | End: 2024-03-15
Attending: INTERNAL MEDICINE
Payer: COMMERCIAL

## 2024-03-15 DIAGNOSIS — E04.1 THYROID NODULE: ICD-10-CM

## 2024-03-15 PROCEDURE — 76536 US EXAM OF HEAD AND NECK: CPT | Performed by: INTERNAL MEDICINE

## 2024-03-16 ENCOUNTER — LAB ENCOUNTER (OUTPATIENT)
Dept: LAB | Facility: HOSPITAL | Age: 44
End: 2024-03-16
Attending: FAMILY MEDICINE
Payer: COMMERCIAL

## 2024-03-16 DIAGNOSIS — E03.8 HYPOTHYROIDISM DUE TO HASHIMOTO'S THYROIDITIS: ICD-10-CM

## 2024-03-16 DIAGNOSIS — E06.3 HYPOTHYROIDISM DUE TO HASHIMOTO'S THYROIDITIS: ICD-10-CM

## 2024-03-16 LAB
ALBUMIN SERPL-MCNC: 4.1 G/DL (ref 3.2–4.8)
ALBUMIN/GLOB SERPL: 1.7 {RATIO} (ref 1–2)
ALP LIVER SERPL-CCNC: 55 U/L
ALT SERPL-CCNC: 11 U/L
ANION GAP SERPL CALC-SCNC: 3 MMOL/L (ref 0–18)
AST SERPL-CCNC: 14 U/L (ref ?–34)
BILIRUB SERPL-MCNC: 0.5 MG/DL (ref 0.3–1.2)
BUN BLD-MCNC: 9 MG/DL (ref 9–23)
BUN/CREAT SERPL: 10.7 (ref 10–20)
CALCIUM BLD-MCNC: 9.3 MG/DL (ref 8.7–10.4)
CHLORIDE SERPL-SCNC: 107 MMOL/L (ref 98–112)
CHOLEST SERPL-MCNC: 125 MG/DL (ref ?–200)
CO2 SERPL-SCNC: 29 MMOL/L (ref 21–32)
CREAT BLD-MCNC: 0.84 MG/DL
EGFRCR SERPLBLD CKD-EPI 2021: 89 ML/MIN/1.73M2 (ref 60–?)
FASTING PATIENT LIPID ANSWER: YES
FASTING STATUS PATIENT QL REPORTED: YES
GLOBULIN PLAS-MCNC: 2.4 G/DL (ref 2.8–4.4)
GLUCOSE BLD-MCNC: 84 MG/DL (ref 70–99)
HDLC SERPL-MCNC: 41 MG/DL (ref 40–59)
LDLC SERPL CALC-MCNC: 72 MG/DL (ref ?–100)
NONHDLC SERPL-MCNC: 84 MG/DL (ref ?–130)
OSMOLALITY SERPL CALC.SUM OF ELEC: 286 MOSM/KG (ref 275–295)
POTASSIUM SERPL-SCNC: 3.9 MMOL/L (ref 3.5–5.1)
PROT SERPL-MCNC: 6.5 G/DL (ref 5.7–8.2)
SODIUM SERPL-SCNC: 139 MMOL/L (ref 136–145)
T3FREE SERPL-MCNC: 2.93 PG/ML (ref 2.4–4.2)
T4 FREE SERPL-MCNC: 1 NG/DL (ref 0.8–1.7)
TRIGL SERPL-MCNC: 53 MG/DL (ref 30–149)
TSI SER-ACNC: 1.59 MIU/ML (ref 0.55–4.78)
VLDLC SERPL CALC-MCNC: 8 MG/DL (ref 0–30)

## 2024-03-16 PROCEDURE — 84443 ASSAY THYROID STIM HORMONE: CPT

## 2024-03-16 PROCEDURE — 80053 COMPREHEN METABOLIC PANEL: CPT

## 2024-03-16 PROCEDURE — 84481 FREE ASSAY (FT-3): CPT

## 2024-03-16 PROCEDURE — 84439 ASSAY OF FREE THYROXINE: CPT

## 2024-03-16 PROCEDURE — 36415 COLL VENOUS BLD VENIPUNCTURE: CPT

## 2024-03-16 PROCEDURE — 80061 LIPID PANEL: CPT

## 2024-03-21 ENCOUNTER — OFFICE VISIT (OUTPATIENT)
Dept: ENDOCRINOLOGY CLINIC | Facility: CLINIC | Age: 44
End: 2024-03-21
Payer: COMMERCIAL

## 2024-03-21 VITALS
HEIGHT: 65 IN | DIASTOLIC BLOOD PRESSURE: 85 MMHG | WEIGHT: 174 LBS | HEART RATE: 81 BPM | SYSTOLIC BLOOD PRESSURE: 134 MMHG | BODY MASS INDEX: 28.99 KG/M2

## 2024-03-21 DIAGNOSIS — E06.3 HYPOTHYROIDISM DUE TO HASHIMOTO'S THYROIDITIS: Primary | ICD-10-CM

## 2024-03-21 DIAGNOSIS — E03.8 HYPOTHYROIDISM DUE TO HASHIMOTO'S THYROIDITIS: Primary | ICD-10-CM

## 2024-03-21 DIAGNOSIS — E66.9 OBESITY (BMI 30-39.9): ICD-10-CM

## 2024-03-21 PROCEDURE — 99214 OFFICE O/P EST MOD 30 MIN: CPT | Performed by: INTERNAL MEDICINE

## 2024-03-21 RX ORDER — LEVOTHYROXINE SODIUM 0.05 MG/1
TABLET ORAL
Qty: 114 TABLET | Refills: 3 | Status: SHIPPED | OUTPATIENT
Start: 2024-03-21

## 2024-03-21 RX ORDER — LIOTHYRONINE SODIUM 5 UG/1
5 TABLET ORAL DAILY
Qty: 90 TABLET | Refills: 3 | Status: SHIPPED | OUTPATIENT
Start: 2024-03-21

## 2024-03-21 NOTE — PROGRESS NOTES
Name: Nayely Fisher  Date: 3/21/2024    Referring Physician: No ref. provider found    HISTORY OF PRESENT ILLNESS   Nayely Fisher is a 43 year old female who presents for   Chief Complaint   Patient presents with    Hypothyroidism     Pt is in for a Hypothyroidism follow up     42 y/o F presents for follow up evaluation of subclinical hypothyroidism.  She was diagnosed with hypothyroidism in 6/2020 with TSH 4.9 and fatigue.  Since that time she has been started on levothyroxine 25mcg PO daily.  She is taking medication in AM and waiting 30-60 min before eating.     Since last visit she is maintained on levothyroxine 50mcg PO daily and liothyronine 5mcg PO daily, taking medication in AM and waiting 30-60 min before eating.  Energy level is stable.       She has now been started on Wegovy therapy and able to lose 40lbs.  Weight has stabilized.  Overall tolerating well.  Nausea is improved.     In addition she is taking Vitamin D and Vitamin B12.     Compression Symptoms.   Dysphagia: No  Dyspnea: No  Voice Change: No  Anterior Neck Pain: No     Paternal h/o hypothyroidism  Sister thyroid nodules  Brother hyperthyroidism     Of note she does have history of gestational DM. Both parents have DM2.     REVIEW OF SYSTEMS  Eyes: no change in vision  Neurologic: no headache, generalized or focal weakness or numbness.  Head: normal  ENT: normal  Lungs: no shortness of breath, wheezing or REBLOLAR  Cardiovascular:  no chest pain or palpitations  Gastrointestinal:  no abdominal pain, bowel movement problems  Musculoskeletal: no muscle pain or arthralgia  /Gyne: no frequency or discomfort while urinating  Psychiatric:  no acute distress, anxiety  or depression  Skin: normal moisturized skin    Medications:     Current Outpatient Medications:     levothyroxine 50 MCG Oral Tab, Take 1 tablet (50 mcg total) by mouth before breakfast., Disp: 90 tablet, Rfl: 1    LIOTHYRONINE 5 MCG Oral Tab, TAKE 1 TABLET DAILY, Disp: 90 tablet, Rfl: 3     semaglutide-weight management (WEGOVY) 1.7 MG/0.75ML Subcutaneous Solution Auto-injector, Inject 0.75 mL (1.7 mg total) into the skin once a week., Disp: 9 mL, Rfl: 1    ONEXTON 1.2-3.75 % External Gel, APPLY TO AFFECTED AREA EVERY DAY., Disp: 50 g, Rfl: 2    semaglutide-weight management (WEGOVY) 1.7 MG/0.75ML Subcutaneous Solution Auto-injector, Inject 0.75 mL (1.7 mg total) into the skin once a week. (Patient not taking: Reported on 11/15/2023), Disp: 4 each, Rfl: 0    semaglutide-weight management (WEGOVY) 1.7 MG/0.75ML Subcutaneous Solution Auto-injector, Inject 0.75 mL (1.7 mg total) into the skin once a week. (Patient not taking: Reported on 11/15/2023), Disp: 3 mL, Rfl: 3    ondansetron 4 MG Oral Tablet Dispersible, Take 1 tablet (4 mg total) by mouth every 8 (eight) hours as needed for Nausea., Disp: 30 tablet, Rfl: 0    semaglutide-weight management (WEGOVY) 1 MG/0.5ML Subcutaneous Solution Auto-injector, Inject 0.5 mL (1 mg total) into the skin once a week. (Patient not taking: Reported on 11/15/2023), Disp: 4 each, Rfl: 0    semaglutide-weight management (WEGOVY) 1 MG/0.5ML Subcutaneous Solution Auto-injector, Inject 0.5 mL (1 mg total) into the skin once a week. (Patient not taking: Reported on 11/15/2023), Disp: 4 each, Rfl: 0    cyanocobalamin 100 MCG Oral Tab, Take 2.5 tablets (250 mcg total) by mouth daily., Disp: , Rfl:     Desonide 0.05 % External Ointment, Apply to aa 1-2 x daily sparingly for no more than 2 weeks (Patient not taking: Reported on 11/15/2023), Disp: 15 g, Rfl: 1    Pimecrolimus (ELIDEL) 1 % External Cream, Apply to AA eyelid BID prn flare (Patient not taking: Reported on 11/15/2023), Disp: 30 g, Rfl: 1    albuterol (PROAIR HFA) 108 (90 Base) MCG/ACT Inhalation Aero Soln, Inhale 2 puffs into the lungs every 6 (six) hours as needed for Wheezing., Disp: 1 each, Rfl: 0    levocetirizine (XYZAL) 5 MG Oral Tab, Take 1 tablet (5 mg total) by mouth nightly., Disp: 90 tablet, Rfl: 0     Tazarotene (TAZORAC) 0.1 % External Cream, Apply to aa after moisturizing every other night or 2-3 nights per week (Patient not taking: Reported on 11/15/2023), Disp: 60 g, Rfl: 3    Clobetasol Propionate 0.05 % External Shampoo, Apply to  Scalp and rinse out every day to every other day only when flaring (Patient not taking: Reported on 11/15/2023), Disp: 118 mL, Rfl: 3    Azelaic Acid (FINACEA) 15 % External Foam, Apply 50 g topically 2 (two) times a day., Disp: 50 g, Rfl: 4    Clindamycin Phos-Benzoyl Perox 1.2-2.5 % External Gel, Apply to aa every day (Patient not taking: Reported on 11/15/2023), Disp: 50 g, Rfl: 4    Cetirizine HCl 10 MG Oral Cap, , Disp: , Rfl:     Multiple Vitamins-Calcium (ONE-A-DAY WOMENS FORMULA OR), , Disp: , Rfl:     D-MANNOSE OR, , Disp: , Rfl:     Fluticasone Propionate 50 MCG/ACT Nasal Suspension, 1 spray by Each Nare route daily., Disp: 1 Bottle, Rfl: 2     Allergies:   Allergies   Allergen Reactions    Prochlorperazine OTHER (SEE COMMENTS)     Los control of muscle of neck     Compazine     Vicodin [Hydrocodone-Acetaminophen] NAUSEA AND VOMITING       Social History:   Social History     Socioeconomic History    Marital status:    Occupational History    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 [employer]   Tobacco Use    Smoking status: Never    Smokeless tobacco: Never   Vaping Use    Vaping Use: Never used   Substance and Sexual Activity    Alcohol use: Not Currently     Comment: rarely    Drug use: Never    Sexual activity: Yes     Birth control/protection: Paragard     Comment: 2010   Other Topics Concern    Caffeine Concern Yes     Comment: coffee    Exercise No    Grew up on a farm No    History of tanning Yes    Outdoor occupation No    Breast feeding No    Reaction to local anesthetic No    Pt has a pacemaker No    Pt has a defibrillator No       Medical History:   Past Medical History:   Diagnosis Date    Asthma (HCC)     Gestational diabetes (HCC)     Hemorrhoids      Hypothyroidism        Surgical history:   Past Surgical History:   Procedure Laterality Date          x2       PHYSICAL EXAMINATION:  /85   Pulse 81   Ht 5' 5\" (1.651 m)   Wt 174 lb (78.9 kg)   BMI 28.96 kg/m²     General Appearance:  Alert, in no acute distress, well developed  Eyes: normal conjunctivae, sclera.  Ears/Nose/Mouth/Throat/Neck:  normal hearing, normal speech and irregular thyroid gland   Neurologic: sensory grossly intact and motor grossly intact  Musculoskeletal:  normal muscle strength and tone  PV: normal pulses of carotids, pedals  Skin:  normal moisture and skin texture  Hair & Nails:  normal scalp hair     Neuro:  sensory grossly intact and motor grossly intact  Psychiatric:  oriented to time, self, and place  Nutritional:  no abnormal weight gain or loss    ASSESSMENT/PLAN:    1. Subclinical Hypothyroidism  - Discussed diagnosis with patient  - Discussed importance of long term treatment   - Increase Levothyroxine 50mcg PO Mon-Fri and 2 tablets Sat,    - Continue Liothyronine 5mcg PO daily, verbalized understanding of risks and benefits   - Discussed importance of taking medication in AM and waiting 30-60 min before eating  - Normal TFTs     2. Obesity  - Discussed importance of weight loss  - Discussed importance of low CHO diet  - Congratulated patient on weight loss  - Continue Wegovy 1.7mg subcutaneous weekly     RTC 6 months     3/21/2024  Bea Castellano MD

## 2024-05-22 ENCOUNTER — OFFICE VISIT (OUTPATIENT)
Dept: DERMATOLOGY CLINIC | Facility: CLINIC | Age: 44
End: 2024-05-22

## 2024-05-22 DIAGNOSIS — L70.0 ACNE VULGARIS: ICD-10-CM

## 2024-05-22 DIAGNOSIS — D22.9 MULTIPLE BENIGN NEVI: ICD-10-CM

## 2024-05-22 DIAGNOSIS — L82.1 SEBORRHEIC KERATOSES: ICD-10-CM

## 2024-05-22 DIAGNOSIS — D18.01 CHERRY ANGIOMA: ICD-10-CM

## 2024-05-22 DIAGNOSIS — L81.4 LENTIGINES: Primary | ICD-10-CM

## 2024-05-22 PROCEDURE — 99214 OFFICE O/P EST MOD 30 MIN: CPT | Performed by: STUDENT IN AN ORGANIZED HEALTH CARE EDUCATION/TRAINING PROGRAM

## 2024-05-22 RX ORDER — SYRINGE WITH NEEDLE, 1 ML 25GX5/8"
SYRINGE, EMPTY DISPOSABLE MISCELLANEOUS
COMMUNITY
Start: 2024-03-02

## 2024-05-22 RX ORDER — CLINDAMYCIN PHOSPHATE AND BENZOYL PEROXIDE 10; 50 MG/G; MG/G
GEL TOPICAL
Qty: 50 G | Refills: 11 | Status: SHIPPED | OUTPATIENT
Start: 2024-05-22

## 2024-05-22 NOTE — PROGRESS NOTES
May 22, 2024    Established patient     CHIEF COMPLAINT: FBSE    HISTORY OF PRESENT ILLNESS: .    - No particular lesions of concern.     DERM HISTORY:  History of skin cancer: No     FAMILY HISTORY:  History of melanoma: No  Maternal grandmother BCC    PAST MEDICAL HISTORY:  Past Medical History:    Asthma (HCC)    Gestational diabetes (HCC)    Hemorrhoids    Hypothyroidism       REVIEW OF SYSTEMS:  Constitutional: Denies fever, chills, unintentional weight loss.   Skin as per HPI    Medications  Current Outpatient Medications   Medication Sig Dispense Refill    levothyroxine 50 MCG Oral Tab Take 1 tablet Mon-Fri and 2 tablets Sat, Sunday 114 tablet 3    liothyronine 5 MCG Oral Tab Take 1 tablet (5 mcg total) by mouth daily. 90 tablet 3    semaglutide-weight management (WEGOVY) 1.7 MG/0.75ML Subcutaneous Solution Auto-injector Inject 0.75 mL (1.7 mg total) into the skin once a week. 9 mL 1    ONEXTON 1.2-3.75 % External Gel APPLY TO AFFECTED AREA EVERY DAY. 50 g 2    semaglutide-weight management (WEGOVY) 1.7 MG/0.75ML Subcutaneous Solution Auto-injector Inject 0.75 mL (1.7 mg total) into the skin once a week. (Patient not taking: Reported on 11/15/2023) 4 each 0    semaglutide-weight management (WEGOVY) 1.7 MG/0.75ML Subcutaneous Solution Auto-injector Inject 0.75 mL (1.7 mg total) into the skin once a week. (Patient not taking: Reported on 11/15/2023) 3 mL 3    ondansetron 4 MG Oral Tablet Dispersible Take 1 tablet (4 mg total) by mouth every 8 (eight) hours as needed for Nausea. 30 tablet 0    semaglutide-weight management (WEGOVY) 1 MG/0.5ML Subcutaneous Solution Auto-injector Inject 0.5 mL (1 mg total) into the skin once a week. (Patient not taking: Reported on 11/15/2023) 4 each 0    semaglutide-weight management (WEGOVY) 1 MG/0.5ML Subcutaneous Solution Auto-injector Inject 0.5 mL (1 mg total) into the skin once a week. (Patient not taking: Reported on 11/15/2023) 4 each 0    cyanocobalamin 100 MCG Oral Tab  Take 2.5 tablets (250 mcg total) by mouth daily.      Desonide 0.05 % External Ointment Apply to aa 1-2 x daily sparingly for no more than 2 weeks (Patient not taking: Reported on 11/15/2023) 15 g 1    Pimecrolimus (ELIDEL) 1 % External Cream Apply to AA eyelid BID prn flare (Patient not taking: Reported on 11/15/2023) 30 g 1    albuterol (PROAIR HFA) 108 (90 Base) MCG/ACT Inhalation Aero Soln Inhale 2 puffs into the lungs every 6 (six) hours as needed for Wheezing. 1 each 0    levocetirizine (XYZAL) 5 MG Oral Tab Take 1 tablet (5 mg total) by mouth nightly. 90 tablet 0    Tazarotene (TAZORAC) 0.1 % External Cream Apply to aa after moisturizing every other night or 2-3 nights per week (Patient not taking: Reported on 11/15/2023) 60 g 3    Clobetasol Propionate 0.05 % External Shampoo Apply to  Scalp and rinse out every day to every other day only when flaring (Patient not taking: Reported on 11/15/2023) 118 mL 3    Azelaic Acid (FINACEA) 15 % External Foam Apply 50 g topically 2 (two) times a day. 50 g 4    Clindamycin Phos-Benzoyl Perox 1.2-2.5 % External Gel Apply to aa every day (Patient not taking: Reported on 11/15/2023) 50 g 4    Cetirizine HCl 10 MG Oral Cap       Multiple Vitamins-Calcium (ONE-A-DAY WOMENS FORMULA OR)       D-MANNOSE OR       Fluticasone Propionate 50 MCG/ACT Nasal Suspension 1 spray by Each Nare route daily. 1 Bottle 2       PHYSICAL EXAM:  Patient declined chaperone   General: awake, alert, no acute distress  Skin: Skin exam was performed today including the following: head and face, scalp, neck, chest (including breasts and axillae), abdomen, back, bilateral upper extremities, bilateral lower extremities, hands, feet, digits, nails. Pertinent findings include:   - Scattered bright red-purple dome-shaped papules on the trunk and extremities   - Scattered light brown stellate macules on sun exposed sites  - Scattered, evenly colored, round brown macules and papules with regular borders on  the trunk and extremities  - Numerous scattered skin-colored and brown, waxy, stuck-on papules and plaques on the trunk and extremities      ASSESSMENT & PLAN:  Pathophysiology of diagnoses discussed with patient.  Therapeutic options reviewed. Risks, benefits, and alternatives discussed with patient. Instructions reviewed at length.    #Lentigines  #Seborrheic keratoses   #Cherry angiomas   - Reassurance provided regarding the benign nature of these lesions.    #Multiple benign nevi  - Complete skin exam performed today with no outlier lesions identified   - Reassured patient of benign nature of these lesions.   - Recommend daily photoprotection with broad-spectrum sunscreen, avoidance of sun during peak hours, and sun protective clothing.    - Dermoscopy was used for physical examination of pigmented lesions during today's office visit.    #Acne   - Continue tretinoin 0.05%   - Start onexton once daily to affected areas     #Sebaceous hyperplasia   - Patient to return for cautery     Return to clinic: 3 months or sooner if something concerning arises     Александр Sheriff MD

## 2024-07-23 RX ORDER — SEMAGLUTIDE 1.7 MG/.75ML
INJECTION, SOLUTION SUBCUTANEOUS
Qty: 9 ML | Refills: 3 | Status: SHIPPED | OUTPATIENT
Start: 2024-07-23

## 2024-07-23 NOTE — TELEPHONE ENCOUNTER
Endocrine Refill protocol for oral and injectable diabetic medications    Protocol Criteria: FAIL    -Appointment with Endocrinology completed in the last 6 months or scheduled in the next 3 months    -A1c result below 8.5% in the past 6 months      Verify the above has been completed or scheduled in the appropriate timeline. If so can send a 90 day supply with 1 refill.     Last completed office visit: 3/21/2024 Bea Castellano MD   Next scheduled Follow up:   Future Appointments   Date Time Provider Department Center   9/26/2024  4:45 PM Bea Castellano MD [facility]      Last A1c result: Last A1c value was 5.4% done 12/9/2021.

## 2024-09-06 ENCOUNTER — PATIENT MESSAGE (OUTPATIENT)
Dept: ENDOCRINOLOGY CLINIC | Facility: CLINIC | Age: 44
End: 2024-09-06

## 2024-09-06 DIAGNOSIS — E06.3 HYPOTHYROIDISM DUE TO HASHIMOTO'S THYROIDITIS: Primary | ICD-10-CM

## 2024-09-09 NOTE — TELEPHONE ENCOUNTER
From: Nayely Fisher  To: Bea Castellano  Sent: 9/6/2024 9:59 AM CDT  Subject: Blood work    Can you order any bloodwork you want done before my appt at the end of September? Thank you!

## 2024-09-19 ENCOUNTER — OFFICE VISIT (OUTPATIENT)
Dept: DERMATOLOGY CLINIC | Facility: CLINIC | Age: 44
End: 2024-09-19
Payer: COMMERCIAL

## 2024-09-19 DIAGNOSIS — L73.8 SEBACEOUS HYPERPLASIA: Primary | ICD-10-CM

## 2024-09-19 PROCEDURE — 99213 OFFICE O/P EST LOW 20 MIN: CPT | Performed by: STUDENT IN AN ORGANIZED HEALTH CARE EDUCATION/TRAINING PROGRAM

## 2024-09-19 NOTE — PROGRESS NOTES
September 19, 2024    Established patient     Referred by:   No referring provider defined for this encounter.     CHIEF COMPLAINT: Sebaceous hyperplasia f/u     HISTORY OF PRESENT ILLNESS: .    1. Sebaceous hyperplasia f/u   Location: forehead    Duration: years     DERM HISTORY:  History of skin cancer: No  History of chronic skin disease/condition: No    FAMILY HISTORY:  History of melanoma: Yes, maternal grandmother BCC  History of chronic skin disease/condition: No    History/Other:    REVIEW OF SYSTEMS:  Constitutional: Denies fever, chills, unintentional weight loss.   Skin as per HPI    PAST MEDICAL HISTORY:  Past Medical History:    Acne    Allergic rhinitis    Asthma (HCC)    Eczema    Environmental allergies    Gestational diabetes (HCC)    Hemorrhoids    Hypothyroidism    UTI (urinary tract infection)       Medications  Current Outpatient Medications   Medication Sig Dispense Refill    WEGOVY 1.7 MG/0.75ML Subcutaneous Solution Auto-injector INJECT 0.75 ML (1.7 MG) UNDER THE SKIN ONCE A WEEK 9 mL 3    BD LUER-CONOR SYRINGE 25G X 1\" 3 ML Does not apply Misc       Clindamycin Phos-Benzoyl Perox (ONEXTON) 1.2-3.75 % External Gel Use small amount to face once daily 50 g 11    levothyroxine 50 MCG Oral Tab Take 1 tablet Mon-Fri and 2 tablets Sat, Sunday 114 tablet 3    liothyronine 5 MCG Oral Tab Take 1 tablet (5 mcg total) by mouth daily. 90 tablet 3    ONEXTON 1.2-3.75 % External Gel APPLY TO AFFECTED AREA EVERY DAY. 50 g 2    semaglutide-weight management (WEGOVY) 1.7 MG/0.75ML Subcutaneous Solution Auto-injector Inject 0.75 mL (1.7 mg total) into the skin once a week. (Patient not taking: Reported on 11/15/2023) 4 each 0    semaglutide-weight management (WEGOVY) 1.7 MG/0.75ML Subcutaneous Solution Auto-injector Inject 0.75 mL (1.7 mg total) into the skin once a week. (Patient not taking: Reported on 11/15/2023) 3 mL 3    ondansetron 4 MG Oral Tablet Dispersible Take 1 tablet (4 mg total) by mouth every 8  (eight) hours as needed for Nausea. 30 tablet 0    semaglutide-weight management (WEGOVY) 1 MG/0.5ML Subcutaneous Solution Auto-injector Inject 0.5 mL (1 mg total) into the skin once a week. (Patient not taking: Reported on 11/15/2023) 4 each 0    semaglutide-weight management (WEGOVY) 1 MG/0.5ML Subcutaneous Solution Auto-injector Inject 0.5 mL (1 mg total) into the skin once a week. (Patient not taking: Reported on 11/15/2023) 4 each 0    cyanocobalamin 100 MCG Oral Tab Take 2.5 tablets (250 mcg total) by mouth daily.      Desonide 0.05 % External Ointment Apply to aa 1-2 x daily sparingly for no more than 2 weeks (Patient not taking: Reported on 11/15/2023) 15 g 1    Pimecrolimus (ELIDEL) 1 % External Cream Apply to AA eyelid BID prn flare (Patient not taking: Reported on 11/15/2023) 30 g 1    albuterol (PROAIR HFA) 108 (90 Base) MCG/ACT Inhalation Aero Soln Inhale 2 puffs into the lungs every 6 (six) hours as needed for Wheezing. 1 each 0    levocetirizine (XYZAL) 5 MG Oral Tab Take 1 tablet (5 mg total) by mouth nightly. (Patient not taking: Reported on 5/22/2024) 90 tablet 0    Tazarotene (TAZORAC) 0.1 % External Cream Apply to aa after moisturizing every other night or 2-3 nights per week (Patient not taking: Reported on 11/15/2023) 60 g 3    Clobetasol Propionate 0.05 % External Shampoo Apply to  Scalp and rinse out every day to every other day only when flaring (Patient not taking: Reported on 11/15/2023) 118 mL 3    Azelaic Acid (FINACEA) 15 % External Foam Apply 50 g topically 2 (two) times a day. 50 g 4    Clindamycin Phos-Benzoyl Perox 1.2-2.5 % External Gel Apply to aa every day (Patient not taking: Reported on 5/22/2024) 50 g 4    Cetirizine HCl 10 MG Oral Cap       Multiple Vitamins-Calcium (ONE-A-DAY WOMENS FORMULA OR)       D-MANNOSE OR       Fluticasone Propionate 50 MCG/ACT Nasal Suspension 1 spray by Each Nare route daily. 1 Bottle 2       Objective:    PHYSICAL EXAM:  General: awake, alert, no  acute distress  Skin: Skin exam was performed today including the following: face. Pertinent findings include:   - with yellow pink papules    ASSESSMENT & PLAN:  Pathophysiology of diagnoses discussed with patient.  Therapeutic options reviewed. Risks, benefits, and alternatives discussed with patient. Instructions reviewed at length.    #Sebaceous hyperplasia  - Recommended continuing tretinoin 0.05% once nightly  - Cautery, low, blunt tip at 2.3 to lesions on face  Risks (including, but not limited to scarring, pain, bleeding, infection, dyschromia) benefits, alternatives and personnel discussed with patient who consents to proceed. Vaseline applied after    Return to clinic: as needed or sooner if something concerning arises     Александр Sheriff MD

## 2024-09-21 ENCOUNTER — LAB ENCOUNTER (OUTPATIENT)
Dept: LAB | Facility: HOSPITAL | Age: 44
End: 2024-09-21
Attending: INTERNAL MEDICINE
Payer: COMMERCIAL

## 2024-09-21 DIAGNOSIS — E06.3 HYPOTHYROIDISM DUE TO HASHIMOTO'S THYROIDITIS: ICD-10-CM

## 2024-09-21 LAB
ALBUMIN SERPL-MCNC: 4 G/DL (ref 3.2–4.8)
ALBUMIN/GLOB SERPL: 1.7 {RATIO} (ref 1–2)
ALP LIVER SERPL-CCNC: 47 U/L
ALT SERPL-CCNC: 9 U/L
ANION GAP SERPL CALC-SCNC: 6 MMOL/L (ref 0–18)
AST SERPL-CCNC: 13 U/L (ref ?–34)
BILIRUB SERPL-MCNC: 0.4 MG/DL (ref 0.3–1.2)
BUN BLD-MCNC: 10 MG/DL (ref 9–23)
BUN/CREAT SERPL: 11.6 (ref 10–20)
CALCIUM BLD-MCNC: 8.9 MG/DL (ref 8.7–10.4)
CHLORIDE SERPL-SCNC: 107 MMOL/L (ref 98–112)
CHOLEST SERPL-MCNC: 118 MG/DL (ref ?–200)
CO2 SERPL-SCNC: 27 MMOL/L (ref 21–32)
CREAT BLD-MCNC: 0.86 MG/DL
EGFRCR SERPLBLD CKD-EPI 2021: 86 ML/MIN/1.73M2 (ref 60–?)
FASTING PATIENT LIPID ANSWER: YES
FASTING STATUS PATIENT QL REPORTED: YES
GLOBULIN PLAS-MCNC: 2.4 G/DL (ref 2–3.5)
GLUCOSE BLD-MCNC: 90 MG/DL (ref 70–99)
HDLC SERPL-MCNC: 44 MG/DL (ref 40–59)
LDLC SERPL CALC-MCNC: 63 MG/DL (ref ?–100)
NONHDLC SERPL-MCNC: 74 MG/DL (ref ?–130)
OSMOLALITY SERPL CALC.SUM OF ELEC: 289 MOSM/KG (ref 275–295)
POTASSIUM SERPL-SCNC: 4.1 MMOL/L (ref 3.5–5.1)
PROT SERPL-MCNC: 6.4 G/DL (ref 5.7–8.2)
SODIUM SERPL-SCNC: 140 MMOL/L (ref 136–145)
T3FREE SERPL-MCNC: 2.63 PG/ML (ref 2.4–4.2)
T4 FREE SERPL-MCNC: 1.1 NG/DL (ref 0.8–1.7)
TRIGL SERPL-MCNC: 43 MG/DL (ref 30–149)
TSI SER-ACNC: 1.64 MIU/ML (ref 0.55–4.78)
VLDLC SERPL CALC-MCNC: 6 MG/DL (ref 0–30)

## 2024-09-21 PROCEDURE — 80053 COMPREHEN METABOLIC PANEL: CPT

## 2024-09-21 PROCEDURE — 84439 ASSAY OF FREE THYROXINE: CPT

## 2024-09-21 PROCEDURE — 80061 LIPID PANEL: CPT

## 2024-09-21 PROCEDURE — 84481 FREE ASSAY (FT-3): CPT

## 2024-09-21 PROCEDURE — 36415 COLL VENOUS BLD VENIPUNCTURE: CPT

## 2024-09-21 PROCEDURE — 84443 ASSAY THYROID STIM HORMONE: CPT

## 2024-09-26 ENCOUNTER — OFFICE VISIT (OUTPATIENT)
Dept: ENDOCRINOLOGY CLINIC | Facility: CLINIC | Age: 44
End: 2024-09-26

## 2024-09-26 ENCOUNTER — LAB ENCOUNTER (OUTPATIENT)
Dept: LAB | Facility: HOSPITAL | Age: 44
End: 2024-09-26
Attending: INTERNAL MEDICINE
Payer: COMMERCIAL

## 2024-09-26 VITALS
WEIGHT: 172 LBS | HEART RATE: 82 BPM | BODY MASS INDEX: 29 KG/M2 | DIASTOLIC BLOOD PRESSURE: 85 MMHG | SYSTOLIC BLOOD PRESSURE: 136 MMHG

## 2024-09-26 DIAGNOSIS — E66.9 OBESITY (BMI 30.0-34.9): ICD-10-CM

## 2024-09-26 DIAGNOSIS — R53.83 FATIGUE, UNSPECIFIED TYPE: ICD-10-CM

## 2024-09-26 DIAGNOSIS — E06.3 HYPOTHYROIDISM DUE TO HASHIMOTO'S THYROIDITIS: Primary | ICD-10-CM

## 2024-09-26 LAB
BASOPHILS # BLD AUTO: 0.07 X10(3) UL (ref 0–0.2)
BASOPHILS NFR BLD AUTO: 0.8 %
DEPRECATED HBV CORE AB SER IA-ACNC: 28 NG/ML
DEPRECATED RDW RBC AUTO: 45.8 FL (ref 35.1–46.3)
EOSINOPHIL # BLD AUTO: 0.16 X10(3) UL (ref 0–0.7)
EOSINOPHIL NFR BLD AUTO: 1.8 %
ERYTHROCYTE [DISTWIDTH] IN BLOOD BY AUTOMATED COUNT: 13.2 % (ref 11–15)
HCT VFR BLD AUTO: 37.1 %
HGB BLD-MCNC: 13.5 G/DL
IMM GRANULOCYTES # BLD AUTO: 0.03 X10(3) UL (ref 0–1)
IMM GRANULOCYTES NFR BLD: 0.3 %
IRON SATN MFR SERPL: 21 %
IRON SERPL-MCNC: 75 UG/DL
LYMPHOCYTES # BLD AUTO: 2.21 X10(3) UL (ref 1–4)
LYMPHOCYTES NFR BLD AUTO: 25.4 %
MCH RBC QN AUTO: 35 PG (ref 26–34)
MCHC RBC AUTO-ENTMCNC: 36.4 G/DL (ref 31–37)
MCV RBC AUTO: 96.1 FL
MONOCYTES # BLD AUTO: 0.51 X10(3) UL (ref 0.1–1)
MONOCYTES NFR BLD AUTO: 5.9 %
NEUTROPHILS # BLD AUTO: 5.72 X10 (3) UL (ref 1.5–7.7)
NEUTROPHILS # BLD AUTO: 5.72 X10(3) UL (ref 1.5–7.7)
NEUTROPHILS NFR BLD AUTO: 65.8 %
PLATELET # BLD AUTO: 284 10(3)UL (ref 150–450)
RBC # BLD AUTO: 3.86 X10(6)UL
TIBC SERPL-MCNC: 359 UG/DL (ref 250–425)
TRANSFERRIN SERPL-MCNC: 241 MG/DL (ref 250–380)
VIT B12 SERPL-MCNC: 581 PG/ML (ref 211–911)
VIT D+METAB SERPL-MCNC: 65.6 NG/ML (ref 30–100)
WBC # BLD AUTO: 8.7 X10(3) UL (ref 4–11)

## 2024-09-26 PROCEDURE — 82728 ASSAY OF FERRITIN: CPT

## 2024-09-26 PROCEDURE — 83540 ASSAY OF IRON: CPT

## 2024-09-26 PROCEDURE — 82306 VITAMIN D 25 HYDROXY: CPT

## 2024-09-26 PROCEDURE — 99214 OFFICE O/P EST MOD 30 MIN: CPT | Performed by: INTERNAL MEDICINE

## 2024-09-26 PROCEDURE — 82607 VITAMIN B-12: CPT

## 2024-09-26 PROCEDURE — 85025 COMPLETE CBC W/AUTO DIFF WBC: CPT

## 2024-09-26 PROCEDURE — 84466 ASSAY OF TRANSFERRIN: CPT

## 2024-09-26 PROCEDURE — 36415 COLL VENOUS BLD VENIPUNCTURE: CPT

## 2024-09-26 NOTE — PROGRESS NOTES
Name: Nayely Fisher  Date: 9/26/2024    Referring Physician: No ref. provider found    HISTORY OF PRESENT ILLNESS   Nayely Fisher is a 44 year old female who presents for   Chief Complaint   Patient presents with    Hypothyroidism     43 y/o F presents for follow up evaluation of subclinical hypothyroidism.  She was diagnosed with hypothyroidism in 6/2020 with TSH 4.9 and fatigue.  Since that time she has been started on levothyroxine 25mcg PO daily.  She is taking medication in AM and waiting 30-60 min before eating.     Since last visit she is maintained on levothyroxine 50mcg PO daily and liothyronine 5mcg PO daily, taking medication in AM and waiting 30-60 min before eating.  Energy level is stable.       She has now been started on Wegovy therapy and able to lose 40lbs.  Weight has stabilized.  Overall tolerating well.  Nausea is improved.     In addition she is taking Vitamin D and Vitamin B12.     Compression Symptoms.   Dysphagia: No  Dyspnea: No  Voice Change: No  Anterior Neck Pain: No     Paternal h/o hypothyroidism  Sister thyroid nodules  Brother hyperthyroidism     Of note she does have history of gestational DM. Both parents have DM2.     REVIEW OF SYSTEMS  Eyes: no change in vision  Neurologic: no headache, generalized or focal weakness or numbness.  Head: normal  ENT: normal  Lungs: no shortness of breath, wheezing or REBOLLAR  Cardiovascular:  no chest pain or palpitations  Gastrointestinal:  no abdominal pain, bowel movement problems  Musculoskeletal: no muscle pain or arthralgia  /Gyne: no frequency or discomfort while urinating  Psychiatric:  no acute distress, anxiety  or depression  Skin: normal moisturized skin    Medications:     Current Outpatient Medications:     WEGOVY 1.7 MG/0.75ML Subcutaneous Solution Auto-injector, INJECT 0.75 ML (1.7 MG) UNDER THE SKIN ONCE A WEEK, Disp: 9 mL, Rfl: 3    BD LUER-CONOR SYRINGE 25G X 1\" 3 ML Does not apply Misc, , Disp: , Rfl:     levothyroxine 50 MCG Oral  Tab, Take 1 tablet Mon-Fri and 2 tablets Sat, Sunday, Disp: 114 tablet, Rfl: 3    liothyronine 5 MCG Oral Tab, Take 1 tablet (5 mcg total) by mouth daily., Disp: 90 tablet, Rfl: 3    ONEXTON 1.2-3.75 % External Gel, APPLY TO AFFECTED AREA EVERY DAY., Disp: 50 g, Rfl: 2    ondansetron 4 MG Oral Tablet Dispersible, Take 1 tablet (4 mg total) by mouth every 8 (eight) hours as needed for Nausea., Disp: 30 tablet, Rfl: 0    cyanocobalamin 100 MCG Oral Tab, Take 2.5 tablets (250 mcg total) by mouth daily., Disp: , Rfl:     albuterol (PROAIR HFA) 108 (90 Base) MCG/ACT Inhalation Aero Soln, Inhale 2 puffs into the lungs every 6 (six) hours as needed for Wheezing., Disp: 1 each, Rfl: 0    Cetirizine HCl 10 MG Oral Cap, , Disp: , Rfl:     Multiple Vitamins-Calcium (ONE-A-DAY WOMENS FORMULA OR), , Disp: , Rfl:     D-MANNOSE OR, , Disp: , Rfl:     Fluticasone Propionate 50 MCG/ACT Nasal Suspension, 1 spray by Each Nare route daily., Disp: 1 Bottle, Rfl: 2    Clindamycin Phos-Benzoyl Perox (ONEXTON) 1.2-3.75 % External Gel, Use small amount to face once daily (Patient not taking: Reported on 9/19/2024), Disp: 50 g, Rfl: 11    Desonide 0.05 % External Ointment, Apply to aa 1-2 x daily sparingly for no more than 2 weeks (Patient not taking: Reported on 11/15/2023), Disp: 15 g, Rfl: 1    Pimecrolimus (ELIDEL) 1 % External Cream, Apply to AA eyelid BID prn flare (Patient not taking: Reported on 11/15/2023), Disp: 30 g, Rfl: 1    levocetirizine (XYZAL) 5 MG Oral Tab, Take 1 tablet (5 mg total) by mouth nightly. (Patient not taking: Reported on 5/22/2024), Disp: 90 tablet, Rfl: 0    Tazarotene (TAZORAC) 0.1 % External Cream, Apply to aa after moisturizing every other night or 2-3 nights per week (Patient not taking: Reported on 11/15/2023), Disp: 60 g, Rfl: 3    Clobetasol Propionate 0.05 % External Shampoo, Apply to  Scalp and rinse out every day to every other day only when flaring (Patient not taking: Reported on 11/15/2023), Disp:  118 mL, Rfl: 3    Azelaic Acid (FINACEA) 15 % External Foam, Apply 50 g topically 2 (two) times a day., Disp: 50 g, Rfl: 4    Clindamycin Phos-Benzoyl Perox 1.2-2.5 % External Gel, Apply to aa every day (Patient not taking: Reported on 2024), Disp: 50 g, Rfl: 4     Allergies:   Allergies   Allergen Reactions    Prochlorperazine OTHER (SEE COMMENTS)     Los control of muscle of neck     Compazine     Vicodin [Hydrocodone-Acetaminophen] NAUSEA AND VOMITING       Social History:   Social History     Socioeconomic History    Marital status:    Occupational History    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   Tobacco Use    Smoking status: Never    Smokeless tobacco: Never   Vaping Use    Vaping status: Never Used   Substance and Sexual Activity    Alcohol use: Yes     Comment: once per month    Drug use: Never    Sexual activity: Yes     Birth control/protection: Paragard     Comment:    Other Topics Concern    Caffeine Concern Yes     Comment: coffee    Exercise No    Grew up on a farm No    History of tanning No    Outdoor occupation No    Breast feeding No    Reaction to local anesthetic No    Pt has a pacemaker No    Pt has a defibrillator No       Medical History:   Past Medical History:    Acne    Allergic rhinitis    Asthma (HCC)    Eczema    Environmental allergies    Gestational diabetes (HCC)    Hemorrhoids    Hypothyroidism    UTI (urinary tract infection)       Surgical history:   Past Surgical History:   Procedure Laterality Date          x2       PHYSICAL EXAMINATION:  /85   Pulse 82   Wt 172 lb (78 kg)   BMI 28.62 kg/m²     General Appearance:  Alert, in no acute distress, well developed  Eyes: normal conjunctivae, sclera.  Ears/Nose/Mouth/Throat/Neck:  normal hearing, normal speech and irregular thyroid gland   Neurologic: sensory grossly intact and motor grossly intact  Musculoskeletal:  normal muscle strength and tone  PV: normal pulses of carotids, pedals  Skin:   normal moisture and skin texture  Hair & Nails:  normal scalp hair     Neuro:  sensory grossly intact and motor grossly intact  Psychiatric:  oriented to time, self, and place  Nutritional:  no abnormal weight gain or loss    ASSESSMENT/PLAN:    1. Subclinical Hypothyroidism  - Discussed diagnosis with patient  - Discussed importance of long term treatment   - Continue Levothyroxine 50mcg PO daily   - Continue Liothyronine 5mcg PO daily, verbalized understanding of risks and benefits   - Discussed importance of taking medication in AM and waiting 30-60 min before eating  - Normal TFTs   - Stable Thyroid  3/2024     2. Obesity  - Discussed importance of weight loss  - Discussed importance of low CHO diet  - Congratulated patient on weight loss  - Continue Wegovy 1.7mg subcutaneous weekly     3. Fatigue  - Persistent fatigue  - Check Iron, Vitamin D and Vitamin B12     RTC 1 year     9/26/2024  Bea Castellano MD

## 2024-11-09 ENCOUNTER — PATIENT MESSAGE (OUTPATIENT)
Dept: ENDOCRINOLOGY CLINIC | Facility: CLINIC | Age: 44
End: 2024-11-09

## 2024-11-12 NOTE — TELEPHONE ENCOUNTER
Called [facility], spoke to Zahra, received continued authorization for Wegovy.    Case #: [id number]  Effective from 1/13/24 to 11/12/2025.    Zahra did a test claim and it went through.

## 2024-11-13 NOTE — TELEPHONE ENCOUNTER
Received fax from [employer] in regards to Wevogy 1.7mg/0.75 pen inject. Medication has been approved from 10/13/24 to 11/12/25. [employer] message sent to pt and approval letter sent to scanning.     Approval # -  [id number]

## 2024-11-29 NOTE — TELEPHONE ENCOUNTER
Refill Request for medication(s): TRETINOIN CREAM 45GM 0.05%     Last Office Visit: 09/09/24    Last Refill: 11/15/23    Pharmacy, Dosage verified: [facility] - [address] [phone], [phone]    Condition Update (if applicable):     Rx pended and sent to provider for approval, please advise. Thank You!

## 2024-12-02 RX ORDER — TRETINOIN 0.5 MG/G
CREAM TOPICAL
Qty: 45 G | Refills: 7 | Status: SHIPPED | OUTPATIENT
Start: 2024-12-02

## 2025-04-18 RX ORDER — LEVOTHYROXINE SODIUM 50 UG/1
TABLET ORAL
Qty: 114 TABLET | Refills: 3 | Status: SHIPPED | OUTPATIENT
Start: 2025-04-18

## 2025-04-18 RX ORDER — LIOTHYRONINE SODIUM 5 UG/1
5 TABLET ORAL DAILY
Qty: 90 TABLET | Refills: 3 | Status: SHIPPED | OUTPATIENT
Start: 2025-04-18

## 2025-04-18 NOTE — TELEPHONE ENCOUNTER
Endocrine refill protocol for medications for hypothyroidism and hyperthyroidism    Protocol Criteria:  PASSED     If all below requirements are met, send a 90-day supply with 1 refill per provider protocol.    Verify appointment with Endocrinology completed in the last 12 months or scheduled in the next 6 months.    Normal TSH result in the past 12 months   Review recent telephone encounters and mychart communications with patient to ensure a dose change has not occurred since last office visit that was not updated in the medication history list     Last completed office visit:9/26/2024 Bea Castellano MD   Next scheduled Follow up:   Future Appointments   Date Time Provider Department Center   9/25/2025  5:30 PM Bea Castellano MD [facility]      Last TSH result:   TSH   Date Value Ref Range Status   09/21/2024 1.637 0.550 - 4.780 mIU/mL Final   09/18/2021 1.110 0.270 - 4.200 mIU/L Final

## 2025-06-19 RX ORDER — SEMAGLUTIDE 1.7 MG/.75ML
INJECTION, SOLUTION SUBCUTANEOUS
Qty: 9 ML | Refills: 3 | Status: SHIPPED | OUTPATIENT
Start: 2025-06-19

## 2025-06-19 NOTE — TELEPHONE ENCOUNTER
Endocrine Refill protocol for weight management    Protocol Criteria:  PASSED Reason: N/A    If below requirement is met, send a 90-day supply with 1 refill per provider protocol.    Verify appointment with Endocrinology completed in the last 6 months or scheduled in the next 3 months.    Last completed office visit:9/26/2024 Bea Castellano MD   Next scheduled Follow up:   Future Appointments   Date Time Provider Department Center   9/25/2025  5:30 PM [facility]

## (undated) DIAGNOSIS — E03.8 HYPOTHYROIDISM DUE TO HASHIMOTO'S THYROIDITIS: Primary | ICD-10-CM

## (undated) DIAGNOSIS — E55.9 VITAMIN D DEFICIENCY: ICD-10-CM

## (undated) DIAGNOSIS — E06.3 HYPOTHYROIDISM DUE TO HASHIMOTO'S THYROIDITIS: Primary | ICD-10-CM

## (undated) NOTE — MR AVS SNAPSHOT
[address] [facility]  [phone]               Thank you for choosing us for your health care visit with Matti Madrigal MD.  We are glad to serve you and happy to provide you with this summar Home care  · Rest at home. Drink plenty of fluids so you won't get dehydrated. · If the test is positive for strep, don't go to work or school for the first 2 days of taking the antibiotics. After this time, you will not be contagious.  You can then return · Fever as directed by your healthcare provider. For children, seek care if:  ¨ Your child is of any age and has repeated fevers above 104°F (40°C).   ¨ Your child is younger than 3years of age and has a fever of 100.4°F (38°C) that continues for more than Phone:  [phone]    - Clindamycin HCl 150 MG Caps  - predniSONE 20 MG Tabs            MyChart     Visit MyChart  You can access your MyChart to more actively manage your health care and view more details from this visit by going to [email or web address]